# Patient Record
Sex: FEMALE | Race: WHITE | NOT HISPANIC OR LATINO | Employment: FULL TIME | URBAN - METROPOLITAN AREA
[De-identification: names, ages, dates, MRNs, and addresses within clinical notes are randomized per-mention and may not be internally consistent; named-entity substitution may affect disease eponyms.]

---

## 2020-01-30 ENCOUNTER — OFFICE VISIT (OUTPATIENT)
Dept: FAMILY MEDICINE CLINIC | Facility: CLINIC | Age: 35
End: 2020-01-30

## 2020-01-30 VITALS
TEMPERATURE: 98.6 F | WEIGHT: 121.7 LBS | BODY MASS INDEX: 20.78 KG/M2 | DIASTOLIC BLOOD PRESSURE: 71 MMHG | SYSTOLIC BLOOD PRESSURE: 114 MMHG | HEIGHT: 64 IN | HEART RATE: 57 BPM | OXYGEN SATURATION: 99 %

## 2020-01-30 DIAGNOSIS — Z13.6 ENCOUNTER FOR SCREENING FOR CARDIOVASCULAR DISORDERS: ICD-10-CM

## 2020-01-30 DIAGNOSIS — K21.9 GERD WITHOUT ESOPHAGITIS: ICD-10-CM

## 2020-01-30 DIAGNOSIS — N91.2 AMENORRHEA: ICD-10-CM

## 2020-01-30 DIAGNOSIS — Z00.00 ENCOUNTER FOR ANNUAL HEALTH EXAMINATION: Primary | ICD-10-CM

## 2020-01-30 DIAGNOSIS — J30.1 SEASONAL ALLERGIC RHINITIS DUE TO POLLEN: ICD-10-CM

## 2020-01-30 PROCEDURE — 99385 PREV VISIT NEW AGE 18-39: CPT | Performed by: FAMILY MEDICINE

## 2020-01-30 RX ORDER — FLUTICASONE PROPIONATE 50 MCG
2 SPRAY, SUSPENSION (ML) NASAL DAILY
Qty: 1 BOTTLE | Refills: 11 | Status: SHIPPED | OUTPATIENT
Start: 2020-01-30 | End: 2021-01-31 | Stop reason: SDUPTHER

## 2020-01-30 RX ORDER — PANTOPRAZOLE SODIUM 40 MG/1
40 TABLET, DELAYED RELEASE ORAL DAILY
Qty: 90 TABLET | Refills: 3 | Status: SHIPPED | OUTPATIENT
Start: 2020-01-30 | End: 2020-07-15 | Stop reason: SDUPTHER

## 2020-01-30 RX ORDER — NORGESTIMATE AND ETHINYL ESTRADIOL
1 KIT DAILY
COMMUNITY
Start: 2020-01-14

## 2020-01-30 RX ORDER — CHOLECALCIFEROL (VITAMIN D3) 25 MCG
1 TABLET,CHEWABLE ORAL DAILY
COMMUNITY
Start: 2018-06-26

## 2020-01-30 NOTE — PROGRESS NOTES
Patient here for annual physical exam    Subjective   Connie Mike is a 34 y.o. female.     History of Present Illness   34 year old wf here for annual.  Just had vaginal delivery 5 months ago.  PMH/FH/SH/ALL/MEDS reviewed. Exercising regularly.   C/o trouble with reflux daily.  Going on years.    C/o drainage for two months.  Lot of head congestion.  Lot of illness with kids.  No otc.   Lot of hoarseness.  Lot of post nasal drip.       The following portions of the patient's history were reviewed and updated as appropriate: allergies, current medications, past family history, past medical history, past social history, past surgical history and problem list  Dentist: UTD.    Last colonoscopy:NA  Optometry:UTD  Last PSA(if applicable):NA  Last mammo(if applicable):NA    Immunization History   Administered Date(s) Administered   • FLUARIX/FLUZONE/AFLURIA/FLULAVAL QUAD 10/03/2019   • Flu Vaccine Intradermal Quad 18-64YR 12/05/2014   • Hepatitis A 10/03/2019   • Tdap 12/05/2014, 06/13/2019       Review of Systems   Constitutional: Negative for appetite change and fatigue.   HENT: Negative for nosebleeds and sore throat.    Eyes: Negative for blurred vision and visual disturbance.   Respiratory: Negative for shortness of breath and wheezing.    Cardiovascular: Negative for chest pain and leg swelling.   Gastrointestinal: Negative for abdominal distention and abdominal pain.   Endocrine: Negative for cold intolerance and polyuria.   Genitourinary: Negative for dysuria and hematuria.   Musculoskeletal: Negative for arthralgias and myalgias.   Skin: Negative for color change and rash.   Neurological: Negative for weakness and confusion.   Psychiatric/Behavioral: Negative for agitation and depressed mood.       Patient Active Problem List   Diagnosis   • UTI symptoms   • Tingling   • Temporal pain   • Non-smoker   • Cystitis, chronic   • Cystitis, acute   • Arthralgia of temporomandibular joint   • Anxiety   • Amenorrhea    • Acute pharyngitis   • Abdominal pain, acute, left lower quadrant   • Encounter for annual health examination   • Seasonal allergic rhinitis due to pollen   • GERD without esophagitis   • Encounter for screening for cardiovascular disorders       No Known Allergies      Current Outpatient Medications:   •  Prenatal Vit-Fe Fum-FA-Omega (PRENATAL MULTI +DHA) 27-0.8-228 MG capsule, Take 1 capsule by mouth Daily., Disp: , Rfl:   •  TRI-LO-SPRINTEC 0.18/0.215/0.25 MG-25 MCG per tablet, Take 1 tablet by mouth Daily., Disp: , Rfl:   •  fluticasone (FLONASE) 50 MCG/ACT nasal spray, 2 sprays into the nostril(s) as directed by provider Daily., Disp: 1 bottle, Rfl: 11  •  pantoprazole (PROTONIX) 40 MG EC tablet, Take 1 tablet by mouth Daily., Disp: 90 tablet, Rfl: 3    Past Medical History:   Diagnosis Date   • Abdominal pain, acute, left lower quadrant    • Acute pharyngitis    • Amenorrhea    • Anxiety    • Arthralgia of temporomandibular joint    • Cystitis, acute    • Cystitis, chronic    • Encounter for preconception consultation    • Encounter for preventive health examination    • Non-smoker     Never a smoker   • Temporal pain    • Tingling    • UTI symptoms    • Visit for gynecologic examination        Past Surgical History:   Procedure Laterality Date   • TONSILLECTOMY         Family History   Problem Relation Age of Onset   • Alcohol abuse Father    • Hypertension Father    • Diabetes type II Father    • Breast cancer Paternal Grandmother    • No Known Problems Other        Social History     Tobacco Use   • Smoking status: Former Smoker   • Smokeless tobacco: Never Used   • Tobacco comment: quit in 2017   Substance Use Topics   • Alcohol use: Yes     Comment: 7 or less drinks per week            Objective     Vitals:    01/30/20 1027   BP: 114/71   Pulse: 57   Temp: 98.6 °F (37 °C)   SpO2: 99%     Body mass index is 20.89 kg/m².    Physical Exam   Constitutional: She appears well-developed and well-nourished.    HENT:   Head: Normocephalic and atraumatic.   Mouth/Throat: Oropharynx is clear and moist.   Eyes: Pupils are equal, round, and reactive to light. No scleral icterus.   Neck: No thyromegaly present.   Cardiovascular: Normal rate and regular rhythm. Exam reveals no gallop and no friction rub.   No murmur heard.  Pulmonary/Chest: Effort normal. No respiratory distress. She has no wheezes. She has no rales. She exhibits no tenderness.   Abdominal: Soft. Bowel sounds are normal. She exhibits no distension. There is no tenderness.   Musculoskeletal: Normal range of motion. She exhibits no edema or deformity.   Lymphadenopathy:     She has no cervical adenopathy.   Neurological: No cranial nerve deficit. She exhibits normal muscle tone.   Skin: Skin is warm and dry. No rash noted. She is not diaphoretic.   Vitals reviewed.      No results found for: GLUCOSE, BUN, CREATININE, EGFRIFNONA, EGFRIFAFRI, BCR, K, CO2, CALCIUM, PROTENTOTREF, ALBUMIN, LABIL2, BILIRUBIN, AST, ALT    WBC   Date Value Ref Range Status   09/05/2019 7.01 4.5 - 11.0 10*3/uL Final     RBC   Date Value Ref Range Status   09/05/2019 3.88 (L) 4.0 - 5.2 10*6/uL Final     Hemoglobin   Date Value Ref Range Status   09/06/2019 11.2 (L) 12.0 - 16.0 g/dL Final     Hematocrit   Date Value Ref Range Status   09/06/2019 34.3 (L) 36.0 - 46.0 % Final     MCV   Date Value Ref Range Status   09/05/2019 94.3 80.0 - 100.0 fL Final     MCH   Date Value Ref Range Status   09/05/2019 30.9 26.0 - 34.0 pg Final     MCHC   Date Value Ref Range Status   09/05/2019 32.8 31.0 - 37.0 g/dL Final     RDW   Date Value Ref Range Status   09/05/2019 13.9 12.0 - 16.8 % Final     MPV   Date Value Ref Range Status   09/05/2019 9.9 6.7 - 10.8 fL Final     Platelets   Date Value Ref Range Status   09/05/2019 168 140 - 440 10*3/uL Final     Neutrophil Rel %   Date Value Ref Range Status   09/05/2019 70.6 45 - 80 % Final     Lymphocyte Rel %   Date Value Ref Range Status   09/05/2019 19.8 15  - 50 % Final     Monocyte Rel %   Date Value Ref Range Status   09/05/2019 8.0 0 - 15 % Final     Eosinophil %   Date Value Ref Range Status   09/05/2019 1.1 0 - 7 % Final     Basophil Rel %   Date Value Ref Range Status   09/05/2019 0.1 0 - 2 % Final     Immature Grans %   Date Value Ref Range Status   09/05/2019 0.4 (H) 0 % Final     Neutrophils Absolute   Date Value Ref Range Status   09/05/2019 4.94 2.0 - 8.8 10*3/uL Final     Lymphocytes Absolute   Date Value Ref Range Status   09/05/2019 1.39 0.7 - 5.5 10*3/uL Final     Monocytes Absolute   Date Value Ref Range Status   09/05/2019 0.56 0.0 - 1.7 10*3/uL Final     Eosinophils Absolute   Date Value Ref Range Status   09/05/2019 0.08 0.0 - 0.8 10*3/uL Final     Basophils Absolute   Date Value Ref Range Status   09/05/2019 0.01 0.0 - 0.2 10*3/uL Final     Immature Grans, Absolute   Date Value Ref Range Status   09/05/2019 0.03 <1 10*3/uL Final     nRBC   Date Value Ref Range Status   09/05/2019 0 0 /100(WBC) Final       No results found for: HGBA1C    No results found for: [id number]    No results found for: TSH    No results found for: CHOL  No results found for: TRIG  No results found for: HDL  No results found for: LDL  No results found for: VLDL  No results found for: LDLHDL      Procedures    Assessment/Plan   Problems Addressed this Visit        Respiratory    Seasonal allergic rhinitis due to pollen       Digestive    GERD without esophagitis    Relevant Medications    pantoprazole (PROTONIX) 40 MG EC tablet       Genitourinary    Amenorrhea    Relevant Orders    Basic Metabolic Panel    TSH Rfx On Abnormal To Free T4       Other    Encounter for annual health examination - Primary    Encounter for screening for cardiovascular disorders    Relevant Orders    Lipid Panel With / Chol / HDL Ratio        Use zyrtec 10 once a day.    Orders Placed This Encounter   Procedures   • Basic Metabolic Panel   • Lipid Panel With / Chol / HDL Ratio   • TSH Rfx On  Abnormal To Free T4       Current Outpatient Medications   Medication Sig Dispense Refill   • Prenatal Vit-Fe Fum-FA-Omega (PRENATAL MULTI +DHA) 27-0.8-228 MG capsule Take 1 capsule by mouth Daily.     • TRI-LO-SPRINTEC 0.18/0.215/0.25 MG-25 MCG per tablet Take 1 tablet by mouth Daily.     • fluticasone (FLONASE) 50 MCG/ACT nasal spray 2 sprays into the nostril(s) as directed by provider Daily. 1 bottle 11   • pantoprazole (PROTONIX) 40 MG EC tablet Take 1 tablet by mouth Daily. 90 tablet 3     No current facility-administered medications for this visit.        Return in about 4 months (around 5/30/2020).    There are no Patient Instructions on file for this visit.

## 2020-01-31 LAB
BUN SERPL-MCNC: 8 MG/DL (ref 6–20)
BUN/CREAT SERPL: 9.5 (ref 7–25)
CALCIUM SERPL-MCNC: 9.3 MG/DL (ref 8.6–10.5)
CHLORIDE SERPL-SCNC: 102 MMOL/L (ref 98–107)
CHOLEST SERPL-MCNC: 135 MG/DL (ref 0–200)
CHOLEST/HDLC SERPL: 2.41 {RATIO}
CO2 SERPL-SCNC: 28.4 MMOL/L (ref 22–29)
CREAT SERPL-MCNC: 0.84 MG/DL (ref 0.57–1)
GLUCOSE SERPL-MCNC: 89 MG/DL (ref 65–99)
HDLC SERPL-MCNC: 56 MG/DL (ref 40–60)
LDLC SERPL CALC-MCNC: 63 MG/DL (ref 0–100)
POTASSIUM SERPL-SCNC: 4.4 MMOL/L (ref 3.5–5.2)
SODIUM SERPL-SCNC: 142 MMOL/L (ref 136–145)
TRIGL SERPL-MCNC: 80 MG/DL (ref 0–150)
TSH SERPL DL<=0.005 MIU/L-ACNC: 2.93 UIU/ML (ref 0.27–4.2)
VLDLC SERPL CALC-MCNC: 16 MG/DL

## 2020-02-18 ENCOUNTER — OFFICE VISIT (OUTPATIENT)
Dept: FAMILY MEDICINE CLINIC | Facility: CLINIC | Age: 35
End: 2020-02-18

## 2020-02-18 VITALS
HEIGHT: 64 IN | OXYGEN SATURATION: 97 % | SYSTOLIC BLOOD PRESSURE: 122 MMHG | BODY MASS INDEX: 20.93 KG/M2 | HEART RATE: 89 BPM | WEIGHT: 122.6 LBS | TEMPERATURE: 99 F | DIASTOLIC BLOOD PRESSURE: 68 MMHG

## 2020-02-18 DIAGNOSIS — R50.9 FEVER AND CHILLS: ICD-10-CM

## 2020-02-18 DIAGNOSIS — R50.9 FEVER AND CHILLS: Primary | ICD-10-CM

## 2020-02-18 LAB
EXPIRATION DATE: NORMAL
FLUAV AG NPH QL: NEGATIVE
FLUBV AG NPH QL: NEGATIVE
INTERNAL CONTROL: NORMAL
Lab: NORMAL

## 2020-02-18 PROCEDURE — 99213 OFFICE O/P EST LOW 20 MIN: CPT | Performed by: FAMILY MEDICINE

## 2020-02-18 PROCEDURE — 87804 INFLUENZA ASSAY W/OPTIC: CPT | Performed by: FAMILY MEDICINE

## 2020-02-18 RX ORDER — AMOXICILLIN AND CLAVULANATE POTASSIUM 875; 125 MG/1; MG/1
1 TABLET, FILM COATED ORAL 2 TIMES DAILY
Qty: 20 TABLET | Refills: 0 | Status: SHIPPED | OUTPATIENT
Start: 2020-02-18 | End: 2020-02-18 | Stop reason: SDUPTHER

## 2020-02-18 RX ORDER — AMOXICILLIN AND CLAVULANATE POTASSIUM 875; 125 MG/1; MG/1
1 TABLET, FILM COATED ORAL 2 TIMES DAILY
Qty: 20 TABLET | Refills: 0 | Status: SHIPPED | OUTPATIENT
Start: 2020-02-18 | End: 2020-03-18

## 2020-02-18 RX ORDER — GUAIFENESIN AND CODEINE PHOSPHATE 100; 10 MG/5ML; MG/5ML
5 SOLUTION ORAL 3 TIMES DAILY PRN
Qty: 200 ML | Refills: 0 | Status: SHIPPED | OUTPATIENT
Start: 2020-02-18 | End: 2020-02-18 | Stop reason: SDUPTHER

## 2020-02-18 RX ORDER — GUAIFENESIN AND CODEINE PHOSPHATE 100; 10 MG/5ML; MG/5ML
5 SOLUTION ORAL 3 TIMES DAILY PRN
Qty: 200 ML | Refills: 0 | Status: SHIPPED | OUTPATIENT
Start: 2020-02-18 | End: 2020-03-18

## 2020-02-18 RX ORDER — CETIRIZINE HYDROCHLORIDE 10 MG/1
10 TABLET ORAL DAILY
COMMUNITY

## 2020-02-18 NOTE — TELEPHONE ENCOUNTER
Was in to see Dr Kim today & was not happy with her visit, felt very rushed & did not get her questioned addressed, she is on flonase & zyrtec which she is not sure if she needs to continue at this time while she is being treated for her URI / sinus infection & taking the Augmentin & Guaifenesin AC,,, her daughter has been diag with bronchitis & was given a breathing treatment, patient has an inhaler & wants to know if she should be using it & patient's ears don't hurt but they are very clogged up, pressure like & they were not looked at, is this related to the URI / Sinus Infection?

## 2020-02-18 NOTE — TELEPHONE ENCOUNTER
Patient's medication needs to go to the [facility]'s on [address] then once they have been sent I can delete the other pharm from her chart. Patient was unhappy with her OV as well & asked for a message to be sent to [facility] for Dr. Lindsay with questions that she has

## 2020-02-18 NOTE — PROGRESS NOTES
"Sherin Mike is a 34 y.o. female.     Chief Complaint   Patient presents with   • Cough   • Fever     x 1 day   • Chills     x 1 day       History of Present Illness   Cough and congestion for a few weeks and was treating for allergies, now general fever and chills for 1 day. Good  P.o.  Started after sick contact.    The following portions of the patient's history were reviewed and updated as appropriate: allergies, current medications, past family history, past medical history, past social history, past surgical history and problem list.    Past Medical History:   Diagnosis Date   • Abdominal pain, acute, left lower quadrant    • Acute pharyngitis    • Amenorrhea    • Anxiety    • Arthralgia of temporomandibular joint    • Cystitis, acute    • Cystitis, chronic    • Encounter for preconception consultation    • Encounter for preventive health examination    • Non-smoker     Never a smoker   • Temporal pain    • Tingling    • UTI symptoms    • Visit for gynecologic examination        Past Surgical History:   Procedure Laterality Date   • TONSILLECTOMY         Family History   Problem Relation Age of Onset   • Alcohol abuse Father    • Hypertension Father    • Diabetes type II Father    • Breast cancer Paternal Grandmother    • No Known Problems Other        Social History     Socioeconomic History   • Marital status:      Spouse name: Not on file   • Number of children: Not on file   • Years of education: Not on file   • Highest education level: Not on file   Tobacco Use   • Smoking status: Former Smoker   • Smokeless tobacco: Never Used   • Tobacco comment: quit in 2017   Substance and Sexual Activity   • Alcohol use: Yes     Comment: 7 or less drinks per week   • Drug use: Never   • Sexual activity: Yes       Review of Systems   Respiratory: Negative for shortness of breath.        Objective   Visit Vitals  /68   Pulse 89   Temp 99 °F (37.2 °C) (Temporal)   Ht 162.6 cm (64\")   Wt 55.6 " kg (122 lb 9.6 oz)   SpO2 97%   BMI 21.04 kg/m²     Body mass index is 21.04 kg/m².  Physical Exam   Constitutional: She is oriented to person, place, and time. She appears well-developed and well-nourished.   HENT:   Op drainage   Cardiovascular: Normal rate, regular rhythm, normal heart sounds and intact distal pulses.   Pulmonary/Chest: Effort normal and breath sounds normal.   Musculoskeletal: Normal range of motion. She exhibits no edema.   Neurological: She is alert and oriented to person, place, and time.   Skin: Skin is warm and dry.   Psychiatric: She has a normal mood and affect. Her behavior is normal.         Assessment/Plan   Connie was seen today for cough, fever and chills.    Diagnoses and all orders for this visit:    Fever and chills  -     POCT Influenza A/B  -     amoxicillin-clavulanate (AUGMENTIN) 875-125 MG per tablet; Take 1 tablet by mouth 2 (Two) Times a Day.  -     guaiFENesin-codeine (GUAIFENESIN AC) 100-10 MG/5ML liquid; Take 5 mL by mouth 3 (Three) Times a Day As Needed for Cough.      Rest, fluids and follow up if worse or no better.

## 2020-02-19 ENCOUNTER — TELEPHONE (OUTPATIENT)
Dept: FAMILY MEDICINE CLINIC | Facility: CLINIC | Age: 35
End: 2020-02-19

## 2020-02-19 DIAGNOSIS — J11.1 INFLUENZA: Primary | ICD-10-CM

## 2020-02-19 RX ORDER — OSELTAMIVIR PHOSPHATE 75 MG/1
75 CAPSULE ORAL 2 TIMES DAILY
Qty: 10 CAPSULE | Refills: 0 | Status: SHIPPED | OUTPATIENT
Start: 2020-02-19 | End: 2020-07-15

## 2020-02-19 NOTE — TELEPHONE ENCOUNTER
Patient was prescribed an antibiotic earlier this week and was diagnosed with the flu. Should she continue the antibiotic while taking Tamiflu? Also, her  Emmanuel [birth year], was exposed and wants to know if Tamiflu can be called in for him? [phone]

## 2020-02-19 NOTE — TELEPHONE ENCOUNTER
Patient was seen yesterday by Dr Kim and was diagnosed with a upper respitory/sinus infection.  She had a slight fever and they did a flu test but it came back negative.  She said today she feels worse and her fever is 101.4.  She said she wants to know if she could still possibly have the flu and it just was negative yesterday, if she needs to be retested or what she should do?  She is worried because Dr Kim said if she would have had the flu, she would have given her a different treatment.  She wanted to see what Dr Lindsay thinks?  Her phone number is [phone].

## 2020-02-19 NOTE — TELEPHONE ENCOUNTER
Spoke with gricel, he wants to know if you think he needs tamiflu. I advised for mj to just finish out the antibiotic as well

## 2020-03-18 ENCOUNTER — TELEPHONE (OUTPATIENT)
Dept: FAMILY MEDICINE CLINIC | Facility: CLINIC | Age: 35
End: 2020-03-18

## 2020-03-18 RX ORDER — AMOXICILLIN 875 MG/1
875 TABLET, COATED ORAL 2 TIMES DAILY
Qty: 20 TABLET | Refills: 0 | Status: SHIPPED | OUTPATIENT
Start: 2020-03-18 | End: 2020-03-28

## 2020-07-15 ENCOUNTER — OFFICE VISIT (OUTPATIENT)
Dept: FAMILY MEDICINE CLINIC | Facility: CLINIC | Age: 35
End: 2020-07-15

## 2020-07-15 VITALS
WEIGHT: 126 LBS | SYSTOLIC BLOOD PRESSURE: 107 MMHG | HEIGHT: 64 IN | BODY MASS INDEX: 21.51 KG/M2 | DIASTOLIC BLOOD PRESSURE: 61 MMHG | OXYGEN SATURATION: 95 % | TEMPERATURE: 98.6 F | HEART RATE: 105 BPM

## 2020-07-15 DIAGNOSIS — K21.9 GERD WITHOUT ESOPHAGITIS: Primary | ICD-10-CM

## 2020-07-15 DIAGNOSIS — J30.1 SEASONAL ALLERGIC RHINITIS DUE TO POLLEN: ICD-10-CM

## 2020-07-15 PROBLEM — Z28.39 IMMUNIZATION DEFICIENCY: Status: ACTIVE | Noted: 2020-07-15

## 2020-07-15 PROCEDURE — 99213 OFFICE O/P EST LOW 20 MIN: CPT | Performed by: FAMILY MEDICINE

## 2020-07-15 PROCEDURE — 90471 IMMUNIZATION ADMIN: CPT | Performed by: FAMILY MEDICINE

## 2020-07-15 PROCEDURE — 90632 HEPA VACCINE ADULT IM: CPT | Performed by: FAMILY MEDICINE

## 2020-07-15 RX ORDER — PANTOPRAZOLE SODIUM 40 MG/1
40 TABLET, DELAYED RELEASE ORAL DAILY
Qty: 90 TABLET | Refills: 3
Start: 2020-07-15 | End: 2021-01-24

## 2020-07-15 NOTE — PROGRESS NOTES
Chief Complaint   Patient presents with   • Heartburn       Subjective   Connie Mike is a 34 y.o. female.     History of Present Illness   F/U DIANN.   Much better.  I used to have it daily.  Now only with tomato base foods.    F/U AR.  Doing well with zyrtec daily.  I use fluticasone daily.    Immunizaiton def.  Needs second Hep A.      The following portions of the patient's history were reviewed and updated as appropriate: allergies, current medications, past family history, past medical history, past social history, past surgical history and problem list.    Review of Systems   Constitutional: Negative for appetite change and fatigue.   HENT: Negative for nosebleeds and sore throat.    Eyes: Negative for blurred vision and visual disturbance.   Respiratory: Negative for shortness of breath and wheezing.    Cardiovascular: Negative for chest pain and leg swelling.   Gastrointestinal: Negative for abdominal distention and abdominal pain.   Endocrine: Negative for cold intolerance and polyuria.   Genitourinary: Negative for dysuria and hematuria.   Musculoskeletal: Negative for arthralgias and myalgias.   Skin: Negative for color change and rash.   Neurological: Negative for weakness and confusion.   Psychiatric/Behavioral: Negative for agitation and depressed mood.       Patient Active Problem List   Diagnosis   • UTI symptoms   • Tingling   • Temporal pain   • Non-smoker   • Cystitis, chronic   • Cystitis, acute   • Arthralgia of temporomandibular joint   • Anxiety   • Amenorrhea   • Acute pharyngitis   • Abdominal pain, acute, left lower quadrant   • Encounter for annual health examination   • Seasonal allergic rhinitis due to pollen   • GERD without esophagitis   • Encounter for screening for cardiovascular disorders   • Immunization deficiency       No Known Allergies      Current Outpatient Medications:   •  cetirizine (zyrTEC) 10 MG tablet, Take 10 mg by mouth Daily., Disp: , Rfl:   •  fluticasone (FLONASE)  50 MCG/ACT nasal spray, 2 sprays into the nostril(s) as directed by provider Daily., Disp: 1 bottle, Rfl: 11  •  pantoprazole (PROTONIX) 40 MG EC tablet, Take 1 tablet by mouth Daily., Disp: 90 tablet, Rfl: 3  •  Prenatal Vit-Fe Fum-FA-Omega (PRENATAL MULTI +DHA) 27-0.8-228 MG capsule, Take 1 capsule by mouth Daily., Disp: , Rfl:   •  TRI-LO-SPRINTEC 0.18/0.215/0.25 MG-25 MCG per tablet, Take 1 tablet by mouth Daily., Disp: , Rfl:     Past Medical History:   Diagnosis Date   • Abdominal pain, acute, left lower quadrant    • Acute pharyngitis    • Amenorrhea    • Anxiety    • Arthralgia of temporomandibular joint    • Cystitis, acute    • Cystitis, chronic    • Encounter for preconception consultation    • Encounter for preventive health examination    • Non-smoker     Never a smoker   • Temporal pain    • Tingling    • UTI symptoms    • Visit for gynecologic examination        Past Surgical History:   Procedure Laterality Date   • TONSILLECTOMY         Family History   Problem Relation Age of Onset   • Alcohol abuse Father    • Hypertension Father    • Diabetes type II Father    • Breast cancer Paternal Grandmother    • No Known Problems Other        Social History     Tobacco Use   • Smoking status: Former Smoker   • Smokeless tobacco: Never Used   • Tobacco comment: quit in 2017   Substance Use Topics   • Alcohol use: Yes     Comment: 7 or less drinks per week            Objective     Vitals:    07/15/20 1322   BP: 107/61   Pulse: 105   Temp: 98.6 °F (37 °C)   SpO2: 95%     Body mass index is 21.62 kg/m².    Physical Exam   Constitutional: She appears well-developed and well-nourished.   HENT:   Head: Normocephalic and atraumatic.   Mouth/Throat: Oropharynx is clear and moist.   Eyes: Pupils are equal, round, and reactive to light. No scleral icterus.   Neck: No thyromegaly present.   Cardiovascular: Normal rate and regular rhythm. Exam reveals no gallop and no friction rub.   No murmur heard.  Pulmonary/Chest:  Effort normal. No respiratory distress. She has no wheezes. She has no rales. She exhibits no tenderness.   Abdominal: Soft. Bowel sounds are normal. She exhibits no distension. There is no tenderness.   Musculoskeletal: Normal range of motion. She exhibits no edema or deformity.   Lymphadenopathy:     She has no cervical adenopathy.   Neurological: No cranial nerve deficit. She exhibits normal muscle tone.   Skin: Skin is warm and dry. No rash noted. She is not diaphoretic.   Vitals reviewed.      Lab Results   Component Value Date    BUN 8 01/30/2020    CREATININE 0.84 01/30/2020    EGFRIFNONA 78 01/30/2020    EGFRIFAFRI 94 01/30/2020    BCR 9.5 01/30/2020    K 4.4 01/30/2020    CO2 28.4 01/30/2020    CALCIUM 9.3 01/30/2020       WBC   Date Value Ref Range Status   09/05/2019 7.01 4.5 - 11.0 10*3/uL Final     RBC   Date Value Ref Range Status   09/05/2019 3.88 (L) 4.0 - 5.2 10*6/uL Final     Hemoglobin   Date Value Ref Range Status   09/06/2019 11.2 (L) 12.0 - 16.0 g/dL Final     Hematocrit   Date Value Ref Range Status   09/06/2019 34.3 (L) 36.0 - 46.0 % Final     MCV   Date Value Ref Range Status   09/05/2019 94.3 80.0 - 100.0 fL Final     MCH   Date Value Ref Range Status   09/05/2019 30.9 26.0 - 34.0 pg Final     MCHC   Date Value Ref Range Status   09/05/2019 32.8 31.0 - 37.0 g/dL Final     RDW   Date Value Ref Range Status   09/05/2019 13.9 12.0 - 16.8 % Final     MPV   Date Value Ref Range Status   09/05/2019 9.9 6.7 - 10.8 fL Final     Platelets   Date Value Ref Range Status   09/05/2019 168 140 - 440 10*3/uL Final     Neutrophil Rel %   Date Value Ref Range Status   09/05/2019 70.6 45 - 80 % Final     Lymphocyte Rel %   Date Value Ref Range Status   09/05/2019 19.8 15 - 50 % Final     Monocyte Rel %   Date Value Ref Range Status   09/05/2019 8.0 0 - 15 % Final     Eosinophil %   Date Value Ref Range Status   09/05/2019 1.1 0 - 7 % Final     Basophil Rel %   Date Value Ref Range Status   09/05/2019 0.1 0 -  2 % Final     Immature Grans %   Date Value Ref Range Status   09/05/2019 0.4 (H) 0 % Final     Neutrophils Absolute   Date Value Ref Range Status   09/05/2019 4.94 2.0 - 8.8 10*3/uL Final     Lymphocytes Absolute   Date Value Ref Range Status   09/05/2019 1.39 0.7 - 5.5 10*3/uL Final     Monocytes Absolute   Date Value Ref Range Status   09/05/2019 0.56 0.0 - 1.7 10*3/uL Final     Eosinophils Absolute   Date Value Ref Range Status   09/05/2019 0.08 0.0 - 0.8 10*3/uL Final     Basophils Absolute   Date Value Ref Range Status   09/05/2019 0.01 0.0 - 0.2 10*3/uL Final     Immature Grans, Absolute   Date Value Ref Range Status   09/05/2019 0.03 <1 10*3/uL Final     nRBC   Date Value Ref Range Status   09/05/2019 0 0 /100(WBC) Final       No results found for: HGBA1C    No results found for: [id number]    TSH   Date Value Ref Range Status   01/30/2020 2.930 0.270 - 4.200 uIU/mL Final       No results found for: CHOL  Lab Results   Component Value Date    TRIG 80 01/30/2020     Lab Results   Component Value Date    HDL 56 01/30/2020     Lab Results   Component Value Date    LDL 63 01/30/2020     Lab Results   Component Value Date    VLDL 16 01/30/2020     No results found for: LDLHDL      Procedures    Assessment/Plan   Problems Addressed this Visit        Respiratory    Seasonal allergic rhinitis due to pollen       Digestive    GERD without esophagitis - Primary    Relevant Medications    pantoprazole (PROTONIX) 40 MG EC tablet          Orders Placed This Encounter   Procedures   • Hepatitis A Vaccine Adult IM       Current Outpatient Medications   Medication Sig Dispense Refill   • cetirizine (zyrTEC) 10 MG tablet Take 10 mg by mouth Daily.     • fluticasone (FLONASE) 50 MCG/ACT nasal spray 2 sprays into the nostril(s) as directed by provider Daily. 1 bottle 11   • pantoprazole (PROTONIX) 40 MG EC tablet Take 1 tablet by mouth Daily. 90 tablet 3   • Prenatal Vit-Fe Fum-FA-Omega (PRENATAL MULTI +DHA) 27-0.8-228 MG  capsule Take 1 capsule by mouth Daily.     • TRI-LO-SPRINTEC 0.18/0.215/0.25 MG-25 MCG per tablet Take 1 tablet by mouth Daily.       No current facility-administered medications for this visit.        Connie Mike had no medications administered during this visit.    Return in about 1 year (around 7/15/2021).    There are no Patient Instructions on file for this visit.

## 2021-01-24 RX ORDER — PANTOPRAZOLE SODIUM 40 MG/1
40 TABLET, DELAYED RELEASE ORAL DAILY
Qty: 90 TABLET | Refills: 3 | Status: SHIPPED | OUTPATIENT
Start: 2021-01-24 | End: 2022-03-02 | Stop reason: SDUPTHER

## 2021-01-31 RX ORDER — FLUTICASONE PROPIONATE 50 MCG
SPRAY, SUSPENSION (ML) NASAL
Qty: 16 G | Refills: 11 | Status: SHIPPED | OUTPATIENT
Start: 2021-01-31 | End: 2022-03-07

## 2021-04-16 ENCOUNTER — BULK ORDERING (OUTPATIENT)
Dept: CASE MANAGEMENT | Facility: OTHER | Age: 36
End: 2021-04-16

## 2021-04-16 DIAGNOSIS — Z23 IMMUNIZATION DUE: ICD-10-CM

## 2021-04-22 ENCOUNTER — OFFICE VISIT (OUTPATIENT)
Dept: FAMILY MEDICINE CLINIC | Facility: CLINIC | Age: 36
End: 2021-04-22

## 2021-04-22 VITALS
HEIGHT: 64 IN | HEART RATE: 59 BPM | DIASTOLIC BLOOD PRESSURE: 58 MMHG | WEIGHT: 128 LBS | OXYGEN SATURATION: 98 % | TEMPERATURE: 98.7 F | SYSTOLIC BLOOD PRESSURE: 94 MMHG | BODY MASS INDEX: 21.85 KG/M2

## 2021-04-22 DIAGNOSIS — K21.9 GERD WITHOUT ESOPHAGITIS: ICD-10-CM

## 2021-04-22 DIAGNOSIS — R14.0 BLOATING: ICD-10-CM

## 2021-04-22 DIAGNOSIS — K59.04 CHRONIC IDIOPATHIC CONSTIPATION: Primary | ICD-10-CM

## 2021-04-22 PROCEDURE — 99214 OFFICE O/P EST MOD 30 MIN: CPT | Performed by: FAMILY MEDICINE

## 2021-04-22 NOTE — PROGRESS NOTES
C/o constipation.    Subjective   Connie Mike is a 35 y.o. female.     History of Present Illness   C/o trouble with irritation and cramping in stomach. I feel bloated all the time.  I am tabatha rin the morning.  Trouble iwht constipation often.   Unable to have improvement with removal of milk products.  No heartburn issues.   No diarrhea.  Hx of constipation.    The following portions of the patient's history were reviewed and updated as appropriate: allergies, current medications, past family history, past medical history, past social history, past surgical history and problem list.    Review of Systems   Constitutional: Negative for appetite change and fatigue.   HENT: Negative for nosebleeds and sore throat.    Eyes: Negative for blurred vision and visual disturbance.   Respiratory: Negative for shortness of breath and wheezing.    Cardiovascular: Negative for chest pain and leg swelling.   Gastrointestinal: Positive for constipation. Negative for abdominal distention, abdominal pain and diarrhea.   Endocrine: Negative for cold intolerance and polyuria.   Genitourinary: Negative for dysuria and hematuria.   Musculoskeletal: Negative for arthralgias and myalgias.   Skin: Negative for color change and rash.   Neurological: Negative for weakness and confusion.   Psychiatric/Behavioral: Negative for agitation and depressed mood.       Patient Active Problem List   Diagnosis   • UTI symptoms   • Tingling   • Temporal pain   • Non-smoker   • Cystitis, chronic   • Cystitis, acute   • Arthralgia of temporomandibular joint   • Anxiety   • Amenorrhea   • Acute pharyngitis   • Abdominal pain, acute, left lower quadrant   • Encounter for annual health examination   • Seasonal allergic rhinitis due to pollen   • GERD without esophagitis   • Encounter for screening for cardiovascular disorders   • Immunization deficiency   • Chronic idiopathic constipation   • Bloating       No Known Allergies      Current Outpatient  Medications:   •  cetirizine (zyrTEC) 10 MG tablet, Take 10 mg by mouth Daily., Disp: , Rfl:   •  fluticasone (FLONASE) 50 MCG/ACT nasal spray, INSTILL 2 SPRAYS IN EACH NOSTRIL EVERY DAY AS DIRECTED BY PROVIDER, Disp: 16 g, Rfl: 11  •  pantoprazole (PROTONIX) 40 MG EC tablet, TAKE 1 TABLET BY MOUTH DAILY, Disp: 90 tablet, Rfl: 3  •  Prenatal Vit-Fe Fum-FA-Omega (PRENATAL MULTI +DHA) 27-0.8-228 MG capsule, Take 1 capsule by mouth Daily., Disp: , Rfl:   •  TRI-LO-SPRINTEC 0.18/0.215/0.25 MG-25 MCG per tablet, Take 1 tablet by mouth Daily., Disp: , Rfl:     Past Medical History:   Diagnosis Date   • Abdominal pain, acute, left lower quadrant    • Acute pharyngitis    • Amenorrhea    • Anxiety    • Arthralgia of temporomandibular joint    • Cystitis, acute    • Cystitis, chronic    • Encounter for preconception consultation    • Encounter for preventive health examination    • Non-smoker     Never a smoker   • Temporal pain    • Tingling    • UTI symptoms    • Visit for gynecologic examination        Past Surgical History:   Procedure Laterality Date   • TONSILLECTOMY         Family History   Problem Relation Age of Onset   • Alcohol abuse Father    • Hypertension Father    • Diabetes type II Father    • Breast cancer Paternal Grandmother    • No Known Problems Other        Social History     Tobacco Use   • Smoking status: Former Smoker   • Smokeless tobacco: Never Used   • Tobacco comment: quit in 2017   Substance Use Topics   • Alcohol use: Yes     Comment: 7 or less drinks per week            Objective     Vitals:    04/22/21 1529   BP: 94/58   Pulse: 59   Temp: 98.7 °F (37.1 °C)   SpO2: 98%     Body mass index is 21.96 kg/m².    Physical Exam  Vitals reviewed.   Constitutional:       Appearance: She is well-developed. She is not diaphoretic.   HENT:      Head: Normocephalic and atraumatic.   Eyes:      General: No scleral icterus.     Pupils: Pupils are equal, round, and reactive to light.   Neck:      Thyroid: No  thyromegaly.   Cardiovascular:      Rate and Rhythm: Normal rate and regular rhythm.      Heart sounds: No murmur heard.   No friction rub. No gallop.    Pulmonary:      Effort: Pulmonary effort is normal. No respiratory distress.      Breath sounds: No wheezing or rales.   Chest:      Chest wall: No tenderness.   Abdominal:      General: Bowel sounds are normal. There is no distension.      Palpations: Abdomen is soft.      Tenderness: There is no abdominal tenderness.   Musculoskeletal:         General: No deformity. Normal range of motion.   Lymphadenopathy:      Cervical: No cervical adenopathy.   Skin:     General: Skin is warm and dry.      Findings: No rash.   Neurological:      Cranial Nerves: No cranial nerve deficit.      Motor: No abnormal muscle tone.         Lab Results   Component Value Date    BUN 8 01/30/2020    CREATININE 0.84 01/30/2020    EGFRIFNONA 78 01/30/2020    EGFRIFAFRI 94 01/30/2020    BCR 9.5 01/30/2020    K 4.4 01/30/2020    CO2 28.4 01/30/2020    CALCIUM 9.3 01/30/2020       WBC   Date Value Ref Range Status   09/05/2019 7.01 4.5 - 11.0 10*3/uL Final     RBC   Date Value Ref Range Status   09/05/2019 3.88 (L) 4.0 - 5.2 10*6/uL Final     Hemoglobin   Date Value Ref Range Status   09/06/2019 11.2 (L) 12.0 - 16.0 g/dL Final     Hematocrit   Date Value Ref Range Status   09/06/2019 34.3 (L) 36.0 - 46.0 % Final     MCV   Date Value Ref Range Status   09/05/2019 94.3 80.0 - 100.0 fL Final     MCH   Date Value Ref Range Status   09/05/2019 30.9 26.0 - 34.0 pg Final     MCHC   Date Value Ref Range Status   09/05/2019 32.8 31.0 - 37.0 g/dL Final     RDW   Date Value Ref Range Status   09/05/2019 13.9 12.0 - 16.8 % Final     MPV   Date Value Ref Range Status   09/05/2019 9.9 6.7 - 10.8 fL Final     Platelets   Date Value Ref Range Status   09/05/2019 168 140 - 440 10*3/uL Final     Neutrophil Rel %   Date Value Ref Range Status   09/05/2019 70.6 45 - 80 % Final     Lymphocyte Rel %   Date Value  Ref Range Status   09/05/2019 19.8 15 - 50 % Final     Monocyte Rel %   Date Value Ref Range Status   09/05/2019 8.0 0 - 15 % Final     Eosinophil %   Date Value Ref Range Status   09/05/2019 1.1 0 - 7 % Final     Basophil Rel %   Date Value Ref Range Status   09/05/2019 0.1 0 - 2 % Final     Immature Grans %   Date Value Ref Range Status   09/05/2019 0.4 (H) 0 % Final     Neutrophils Absolute   Date Value Ref Range Status   09/05/2019 4.94 2.0 - 8.8 10*3/uL Final     Lymphocytes Absolute   Date Value Ref Range Status   09/05/2019 1.39 0.7 - 5.5 10*3/uL Final     Monocytes Absolute   Date Value Ref Range Status   09/05/2019 0.56 0.0 - 1.7 10*3/uL Final     Eosinophils Absolute   Date Value Ref Range Status   09/05/2019 0.08 0.0 - 0.8 10*3/uL Final     Basophils Absolute   Date Value Ref Range Status   09/05/2019 0.01 0.0 - 0.2 10*3/uL Final     Immature Grans, Absolute   Date Value Ref Range Status   09/05/2019 0.03 <1 10*3/uL Final     nRBC   Date Value Ref Range Status   09/05/2019 0 0 /100(WBC) Final       No results found for: HGBA1C    No results found for: [id number]    TSH   Date Value Ref Range Status   01/30/2020 2.930 0.270 - 4.200 uIU/mL Final       No results found for: CHOL  Lab Results   Component Value Date    TRIG 80 01/30/2020     Lab Results   Component Value Date    HDL 56 01/30/2020     Lab Results   Component Value Date    LDL 63 01/30/2020     Lab Results   Component Value Date    VLDL 16 01/30/2020     No results found for: LDLHDL      Procedures    Assessment/Plan   Problems Addressed this Visit     Bloating    Chronic idiopathic constipation - Primary    GERD without esophagitis      Diagnoses       Codes Comments    Chronic idiopathic constipation    -  Primary ICD-10-CM: K59.04  ICD-9-CM: 564.00     Bloating     ICD-10-CM: R14.0  ICD-9-CM: 787.3     GERD without esophagitis     ICD-10-CM: K21.9  ICD-9-CM: 530.81       New problem, uncontrolled constipation.  Start PEG titrated to 1 cap  daily.    DIANN.  Controlled.  Continue PPI.      No orders of the defined types were placed in this encounter.      Current Outpatient Medications   Medication Sig Dispense Refill   • cetirizine (zyrTEC) 10 MG tablet Take 10 mg by mouth Daily.     • fluticasone (FLONASE) 50 MCG/ACT nasal spray INSTILL 2 SPRAYS IN EACH NOSTRIL EVERY DAY AS DIRECTED BY PROVIDER 16 g 11   • pantoprazole (PROTONIX) 40 MG EC tablet TAKE 1 TABLET BY MOUTH DAILY 90 tablet 3   • Prenatal Vit-Fe Fum-FA-Omega (PRENATAL MULTI +DHA) 27-0.8-228 MG capsule Take 1 capsule by mouth Daily.     • TRI-LO-SPRINTEC 0.18/0.215/0.25 MG-25 MCG per tablet Take 1 tablet by mouth Daily.       No current facility-administered medications for this visit.       Connie Mike had no medications administered during this visit.    Return in about 6 weeks (around 6/3/2021).    There are no Patient Instructions on file for this visit.

## 2021-11-02 RX ORDER — PANTOPRAZOLE SODIUM 40 MG/1
40 TABLET, DELAYED RELEASE ORAL DAILY
Qty: 90 TABLET | Refills: 3 | OUTPATIENT
Start: 2021-11-02

## 2021-11-23 ENCOUNTER — TELEPHONE (OUTPATIENT)
Dept: FAMILY MEDICINE CLINIC | Facility: CLINIC | Age: 36
End: 2021-11-23

## 2021-11-23 DIAGNOSIS — R43.2 ABNORMAL SENSE OF TASTE: Primary | ICD-10-CM

## 2021-11-23 NOTE — TELEPHONE ENCOUNTER
Caller: Connie Mike    Relationship: Self    Best call back number: [phone]     What is the best time to reach you: ANYTIME    Who are you requesting to speak with (clinical staff, provider,  specific staff member): CLINICAL / PROVIDER     Do you know the name of the person who called:     What was the call regarding: PATIENT CALLING WANTING TO KNOW IF THERE IS ANYTHING THAT SHE CAN DO TO GET HER TASTE AND SMELL BACK. SHE STATED SHE TESTED NEGATIVE FOR COVID IN October SHE WOULD LIKE TO KNOW IF SHE NEEDS TO SEE AN ENT OR WHAT SHE SHOULD DO    Do you require a callback: YES

## 2022-03-02 RX ORDER — PANTOPRAZOLE SODIUM 40 MG/1
40 TABLET, DELAYED RELEASE ORAL DAILY
Qty: 90 TABLET | Refills: 3 | Status: SHIPPED | OUTPATIENT
Start: 2022-03-02 | End: 2022-03-24 | Stop reason: SDUPTHER

## 2022-03-02 RX ORDER — PANTOPRAZOLE SODIUM 40 MG/1
40 TABLET, DELAYED RELEASE ORAL DAILY
Qty: 90 TABLET | Refills: 3 | OUTPATIENT
Start: 2022-03-02

## 2022-03-02 NOTE — TELEPHONE ENCOUNTER
Caller: BradyChiara hintona    Relationship: Self    Best call back number: [phone]    Requested Prescriptions:   Requested Prescriptions     Pending Prescriptions Disp Refills   • pantoprazole (PROTONIX) 40 MG EC tablet 90 tablet 3     Sig: Take 1 tablet by mouth Daily.        Pharmacy where request should be sent: [id number] [place], [place] - [address] AT [address] - [phone]  - [phone] FX     Additional details provided by patient: PATIENT IS SCHEDULED FOR AN APPOINTMENT ON 3/24/22, WANTS TO KNOW IF SHE CAN HAVE ENOUGH CALLED IN TO MAKE IT UNTIL APPOINTMENT    Does the patient have less than a 3 day supply:  [x] Yes  [] No    Arcenio Rowland Rep   03/02/22 09:53 EST

## 2022-03-07 RX ORDER — FLUTICASONE PROPIONATE 50 MCG
SPRAY, SUSPENSION (ML) NASAL
Qty: 16 G | Refills: 11 | Status: SHIPPED | OUTPATIENT
Start: 2022-03-07

## 2022-03-24 ENCOUNTER — OFFICE VISIT (OUTPATIENT)
Dept: FAMILY MEDICINE CLINIC | Facility: CLINIC | Age: 37
End: 2022-03-24

## 2022-03-24 VITALS
BODY MASS INDEX: 21.1 KG/M2 | WEIGHT: 123.6 LBS | TEMPERATURE: 97.8 F | HEART RATE: 63 BPM | HEIGHT: 64 IN | SYSTOLIC BLOOD PRESSURE: 110 MMHG | DIASTOLIC BLOOD PRESSURE: 60 MMHG | OXYGEN SATURATION: 98 %

## 2022-03-24 DIAGNOSIS — T78.40XA ALLERGIC REACTION, INITIAL ENCOUNTER: ICD-10-CM

## 2022-03-24 DIAGNOSIS — K59.04 CHRONIC IDIOPATHIC CONSTIPATION: ICD-10-CM

## 2022-03-24 DIAGNOSIS — Z00.00 ENCOUNTER FOR ANNUAL HEALTH EXAMINATION: Primary | ICD-10-CM

## 2022-03-24 DIAGNOSIS — Z13.6 ENCOUNTER FOR LIPID SCREENING FOR CARDIOVASCULAR DISEASE: ICD-10-CM

## 2022-03-24 DIAGNOSIS — K21.9 GERD WITHOUT ESOPHAGITIS: ICD-10-CM

## 2022-03-24 DIAGNOSIS — D64.89 ANEMIA DUE TO OTHER CAUSE, NOT CLASSIFIED: ICD-10-CM

## 2022-03-24 DIAGNOSIS — Z13.220 ENCOUNTER FOR LIPID SCREENING FOR CARDIOVASCULAR DISEASE: ICD-10-CM

## 2022-03-24 PROCEDURE — 99214 OFFICE O/P EST MOD 30 MIN: CPT | Performed by: FAMILY MEDICINE

## 2022-03-24 PROCEDURE — 99395 PREV VISIT EST AGE 18-39: CPT | Performed by: FAMILY MEDICINE

## 2022-03-24 RX ORDER — PANTOPRAZOLE SODIUM 40 MG/1
40 TABLET, DELAYED RELEASE ORAL DAILY
Qty: 90 TABLET | Refills: 3 | Status: SHIPPED | OUTPATIENT
Start: 2022-03-24 | End: 2023-03-02

## 2022-03-24 NOTE — PROGRESS NOTES
Patient here for annual physical exam  F/U allergic reaction.  Subjective   Connie Mike is a 36 y.o. female.     History of Present Illness   36 year old WF here for annual.     The following portions of the patient's history were reviewed and updated as appropriate: allergies, current medications, past family history, past medical history, past social history, past surgical history and problem list    Last colonoscopy:NA  Optometry:UTD.  Dentist: UTD.  Last PSA(if applicable):NA  Last mammo(if applicable):NA      F/U DIANN.  Doing well with pantoprazole.  Only heartburn 1 time a week.   F/U constipation.  Doing well with meds.    Hx of anemia.  Hg 11.2 from 2 years ago.    C/o trouble with allergic reaction with foods.  6 times since December.  Has hives.      Immunization History   Administered Date(s) Administered   • COVID-19 (PFIZER) PURPLE CAP 03/26/2021, 04/15/2021, 11/16/2021   • Flu Vaccine Intradermal Quad 18-64YR 12/05/2014   • Flu Vaccine Split Quad 10/22/2021   • FluLaval/Fluarix/Fluzone >6 10/03/2019   • Flublok 18+yrs 09/29/2020   • Hepatitis A 10/03/2019, 07/15/2020   • Tdap 12/05/2014, 06/13/2019       Review of Systems   Constitutional: Negative for appetite change and fatigue.   HENT: Negative for nosebleeds and sore throat.    Eyes: Negative for blurred vision and visual disturbance.   Respiratory: Negative for shortness of breath and wheezing.    Cardiovascular: Negative for chest pain and leg swelling.   Gastrointestinal: Negative for abdominal distention and abdominal pain.   Endocrine: Negative for cold intolerance and polyuria.   Genitourinary: Negative for dysuria and hematuria.   Musculoskeletal: Negative for arthralgias and myalgias.   Skin: Positive for rash. Negative for color change.   Neurological: Negative for weakness and confusion.   Psychiatric/Behavioral: Negative for agitation and depressed mood.       Patient Active Problem List   Diagnosis   • UTI symptoms   • Tingling    • Temporal pain   • Non-smoker   • Cystitis, chronic   • Cystitis, acute   • Arthralgia of temporomandibular joint   • Anxiety   • Amenorrhea   • Acute pharyngitis   • Abdominal pain, acute, left lower quadrant   • Encounter for annual health examination   • Seasonal allergic rhinitis due to pollen   • GERD without esophagitis   • Encounter for screening for cardiovascular disorders   • Immunization deficiency   • Chronic idiopathic constipation   • Bloating   • Abnormal sense of taste   • Allergic reaction   • Other specified anemias       No Known Allergies      Current Outpatient Medications:   •  cetirizine (zyrTEC) 10 MG tablet, Take 10 mg by mouth Daily., Disp: , Rfl:   •  fluticasone (FLONASE) 50 MCG/ACT nasal spray, INSTILL 2 SPRAYS IN EACH NOSTRIL EVERY DAY AS DIRECTED BY PROVIDER, Disp: 16 g, Rfl: 11  •  pantoprazole (PROTONIX) 40 MG EC tablet, Take 1 tablet by mouth Daily., Disp: 90 tablet, Rfl: 3  •  Prenatal Vit-Fe Fum-FA-Omega (PRENATAL MULTI +DHA) 27-0.8-228 MG capsule, Take 1 capsule by mouth Daily., Disp: , Rfl:   •  TRI-LO-SPRINTEC 0.18/0.215/0.25 MG-25 MCG per tablet, Take 1 tablet by mouth Daily., Disp: , Rfl:     Past Medical History:   Diagnosis Date   • Abdominal pain, acute, left lower quadrant    • Acute pharyngitis    • Amenorrhea    • Anxiety    • Arthralgia of temporomandibular joint    • Cystitis, acute    • Cystitis, chronic    • Encounter for preconception consultation    • Encounter for preventive health examination    • Non-smoker     Never a smoker   • Temporal pain    • Tingling    • UTI symptoms    • Visit for gynecologic examination        Past Surgical History:   Procedure Laterality Date   • TONSILLECTOMY         Family History   Problem Relation Age of Onset   • Alcohol abuse Father    • Hypertension Father    • Diabetes type II Father    • Breast cancer Paternal Grandmother    • No Known Problems Other        Social History     Tobacco Use   • Smoking status: Former Smoker    • Smokeless tobacco: Never Used   • Tobacco comment: quit in 2017   Substance Use Topics   • Alcohol use: Yes     Comment: 7 or less drinks per week            Objective     Vitals:    03/24/22 1326   BP: 110/60   Pulse: 63   Temp: 97.8 °F (36.6 °C)   SpO2: 98%     Body mass index is 21.21 kg/m².    Physical Exam  Vitals reviewed.   Constitutional:       Appearance: She is well-developed. She is not diaphoretic.   HENT:      Head: Normocephalic and atraumatic.   Eyes:      General: No scleral icterus.     Pupils: Pupils are equal, round, and reactive to light.   Neck:      Thyroid: No thyromegaly.   Cardiovascular:      Rate and Rhythm: Normal rate and regular rhythm.      Heart sounds: No murmur heard.    No friction rub. No gallop.   Pulmonary:      Effort: Pulmonary effort is normal. No respiratory distress.      Breath sounds: No wheezing or rales.   Chest:      Chest wall: No tenderness.   Abdominal:      General: Bowel sounds are normal. There is no distension.      Palpations: Abdomen is soft.      Tenderness: There is no abdominal tenderness.   Musculoskeletal:         General: No deformity. Normal range of motion.   Lymphadenopathy:      Cervical: No cervical adenopathy.   Skin:     General: Skin is warm and dry.      Findings: No rash.   Neurological:      Cranial Nerves: No cranial nerve deficit.      Motor: No abnormal muscle tone.         Lab Results   Component Value Date    GLUCOSE 89 01/30/2020    BUN 8 01/30/2020    CREATININE 0.84 01/30/2020    EGFRIFNONA 78 01/30/2020    EGFRIFAFRI 94 01/30/2020    BCR 9.5 01/30/2020    K 4.4 01/30/2020    CO2 28.4 01/30/2020    CALCIUM 9.3 01/30/2020       WBC   Date Value Ref Range Status   09/05/2019 7.01 4.5 - 11.0 10*3/uL Final     RBC   Date Value Ref Range Status   09/05/2019 3.88 (L) 4.0 - 5.2 10*6/uL Final     Hemoglobin   Date Value Ref Range Status   09/06/2019 11.2 (L) 12.0 - 16.0 g/dL Final     Hematocrit   Date Value Ref Range Status   09/06/2019  34.3 (L) 36.0 - 46.0 % Final     MCV   Date Value Ref Range Status   09/05/2019 94.3 80.0 - 100.0 fL Final     MCH   Date Value Ref Range Status   09/05/2019 30.9 26.0 - 34.0 pg Final     MCHC   Date Value Ref Range Status   09/05/2019 32.8 31.0 - 37.0 g/dL Final     RDW   Date Value Ref Range Status   09/05/2019 13.9 12.0 - 16.8 % Final     MPV   Date Value Ref Range Status   09/05/2019 9.9 6.7 - 10.8 fL Final     Platelets   Date Value Ref Range Status   09/05/2019 168 140 - 440 10*3/uL Final     Neutrophil Rel %   Date Value Ref Range Status   09/05/2019 70.6 45 - 80 % Final     Lymphocyte Rel %   Date Value Ref Range Status   09/05/2019 19.8 15 - 50 % Final     Monocyte Rel %   Date Value Ref Range Status   09/05/2019 8.0 0 - 15 % Final     Eosinophil %   Date Value Ref Range Status   09/05/2019 1.1 0 - 7 % Final     Basophil Rel %   Date Value Ref Range Status   09/05/2019 0.1 0 - 2 % Final     Immature Grans %   Date Value Ref Range Status   09/05/2019 0.4 (H) 0 % Final     Neutrophils Absolute   Date Value Ref Range Status   09/05/2019 4.94 2.0 - 8.8 10*3/uL Final     Lymphocytes Absolute   Date Value Ref Range Status   09/05/2019 1.39 0.7 - 5.5 10*3/uL Final     Monocytes Absolute   Date Value Ref Range Status   09/05/2019 0.56 0.0 - 1.7 10*3/uL Final     Eosinophils Absolute   Date Value Ref Range Status   09/05/2019 0.08 0.0 - 0.8 10*3/uL Final     Basophils Absolute   Date Value Ref Range Status   09/05/2019 0.01 0.0 - 0.2 10*3/uL Final     Immature Grans, Absolute   Date Value Ref Range Status   09/05/2019 0.03 <1 10*3/uL Final     nRBC   Date Value Ref Range Status   09/05/2019 0 0 /100(WBC) Final       No results found for: HGBA1C    No results found for: [id number]    TSH   Date Value Ref Range Status   01/30/2020 2.930 0.270 - 4.200 uIU/mL Final       No results found for: CHOL  Lab Results   Component Value Date    TRIG 80 01/30/2020     Lab Results   Component Value Date    HDL 56 01/30/2020      Lab Results   Component Value Date    LDL 63 01/30/2020     Lab Results   Component Value Date    VLDL 16 01/30/2020     No results found for: LDLHDL      Procedures    Assessment/Plan   Problems Addressed this Visit     Allergic reaction    Relevant Orders    CBC & Differential    Comprehensive Metabolic Panel    Alpha-Gal IgE Panel    Chronic idiopathic constipation    Encounter for annual health examination - Primary    GERD without esophagitis    Relevant Medications    pantoprazole (PROTONIX) 40 MG EC tablet    Other specified anemias    Relevant Orders    Ferritin    Iron    Vitamin B12    TSH Rfx On Abnormal To Free T4      Other Visit Diagnoses     Encounter for lipid screening for cardiovascular disease        Relevant Orders    Lipid Panel With / Chol / HDL Ratio      Diagnoses       Codes Comments    Encounter for annual health examination    -  Primary ICD-10-CM: Z00.00  ICD-9-CM: V70.0     GERD without esophagitis     ICD-10-CM: K21.9  ICD-9-CM: 530.81     Chronic idiopathic constipation     ICD-10-CM: K59.04  ICD-9-CM: 564.00     Allergic reaction, initial encounter     ICD-10-CM: T78.40XA  ICD-9-CM: 995.3     Anemia due to other cause, not classified     ICD-10-CM: D64.89  ICD-9-CM: 285.8     Encounter for lipid screening for cardiovascular disease     ICD-10-CM: Z13.220, Z13.6  ICD-9-CM: V77.91, V81.2       Allergic reaction.  New problem.  Check alpha gal antibodies.  Allergy w/u neg per pt other than this.    DIANN.  Controlled.  Continue med.  Rf pantoprazole.    CIC.   Check TSH. CMP.  Use miralax prn.   Preventive Counseling:  Encouraged to stay active.  Covid vaccine UTD.  HEP A UTD.  Pneumovax UTD.  Colonoscopy UTD.    Dentist UTD.  Optho UTD.      Orders Placed This Encounter   Procedures   • Comprehensive Metabolic Panel     Order Specific Question:   Release to patient     Answer:   Immediate   • Lipid Panel With / Chol / HDL Ratio     Order Specific Question:   Release to patient      Answer:   Immediate   • Ferritin   • Iron   • Vitamin B12     Order Specific Question:   Release to patient     Answer:   Immediate   • TSH Rfx On Abnormal To Free T4     Order Specific Question:   Release to patient     Answer:   Immediate   • Alpha-Gal IgE Panel     Order Specific Question:   Release to patient     Answer:   Immediate   • CBC & Differential     Order Specific Question:   Manual Differential     Answer:   No       Current Outpatient Medications   Medication Sig Dispense Refill   • cetirizine (zyrTEC) 10 MG tablet Take 10 mg by mouth Daily.     • fluticasone (FLONASE) 50 MCG/ACT nasal spray INSTILL 2 SPRAYS IN EACH NOSTRIL EVERY DAY AS DIRECTED BY PROVIDER 16 g 11   • pantoprazole (PROTONIX) 40 MG EC tablet Take 1 tablet by mouth Daily. 90 tablet 3   • Prenatal Vit-Fe Fum-FA-Omega (PRENATAL MULTI +DHA) 27-0.8-228 MG capsule Take 1 capsule by mouth Daily.     • TRI-LO-SPRINTEC 0.18/0.215/0.25 MG-25 MCG per tablet Take 1 tablet by mouth Daily.       No current facility-administered medications for this visit.       Return in about 1 year (around 3/24/2023).    There are no Patient Instructions on file for this visit.

## 2022-03-31 LAB
ALBUMIN SERPL-MCNC: 4.4 G/DL (ref 3.8–4.8)
ALBUMIN/GLOB SERPL: 2 {RATIO} (ref 1.2–2.2)
ALP SERPL-CCNC: 43 IU/L (ref 44–121)
ALPHA-GAL IGE QN: <0.1 KU/L
ALT SERPL-CCNC: 12 IU/L (ref 0–32)
AST SERPL-CCNC: 27 IU/L (ref 0–40)
BASOPHILS # BLD AUTO: 0.1 X10E3/UL (ref 0–0.2)
BASOPHILS NFR BLD AUTO: 1 %
BEEF IGE QN: <0.1 KU/L
BILIRUB SERPL-MCNC: 0.3 MG/DL (ref 0–1.2)
BUN SERPL-MCNC: 25 MG/DL (ref 6–20)
BUN/CREAT SERPL: 26 (ref 9–23)
CALCIUM SERPL-MCNC: 9.2 MG/DL (ref 8.7–10.2)
CHLORIDE SERPL-SCNC: 101 MMOL/L (ref 96–106)
CHOLEST SERPL-MCNC: 175 MG/DL (ref 100–199)
CHOLEST/HDLC SERPL: 2.2 RATIO (ref 0–4.4)
CO2 SERPL-SCNC: 25 MMOL/L (ref 20–29)
CONV CLASS DESCRIPTION: NORMAL
CREAT SERPL-MCNC: 0.95 MG/DL (ref 0.57–1)
EGFRCR SERPLBLD CKD-EPI 2021: 80 ML/MIN/1.73
EOSINOPHIL # BLD AUTO: 0.1 X10E3/UL (ref 0–0.4)
EOSINOPHIL NFR BLD AUTO: 2 %
ERYTHROCYTE [DISTWIDTH] IN BLOOD BY AUTOMATED COUNT: 11.9 % (ref 11.7–15.4)
FERRITIN SERPL-MCNC: 27 NG/ML (ref 15–150)
GLOBULIN SER CALC-MCNC: 2.2 G/DL (ref 1.5–4.5)
GLUCOSE SERPL-MCNC: 80 MG/DL (ref 65–99)
HCT VFR BLD AUTO: 36.5 % (ref 34–46.6)
HDLC SERPL-MCNC: 80 MG/DL
HGB BLD-MCNC: 12.2 G/DL (ref 11.1–15.9)
IGE SERPL-ACNC: 54 IU/ML (ref 6–495)
IMM GRANULOCYTES # BLD AUTO: 0 X10E3/UL (ref 0–0.1)
IMM GRANULOCYTES NFR BLD AUTO: 0 %
IRON SERPL-MCNC: 140 UG/DL (ref 27–159)
LAMB IGE QN: <0.1 KU/L
LDLC SERPL CALC-MCNC: 80 MG/DL (ref 0–99)
LYMPHOCYTES # BLD AUTO: 1.9 X10E3/UL (ref 0.7–3.1)
LYMPHOCYTES NFR BLD AUTO: 27 %
MCH RBC QN AUTO: 32 PG (ref 26.6–33)
MCHC RBC AUTO-ENTMCNC: 33.4 G/DL (ref 31.5–35.7)
MCV RBC AUTO: 96 FL (ref 79–97)
MONOCYTES # BLD AUTO: 0.5 X10E3/UL (ref 0.1–0.9)
MONOCYTES NFR BLD AUTO: 8 %
NEUTROPHILS # BLD AUTO: 4.5 X10E3/UL (ref 1.4–7)
NEUTROPHILS NFR BLD AUTO: 62 %
PLATELET # BLD AUTO: 198 X10E3/UL (ref 150–450)
PORK IGE QN: <0.1 KU/L
POTASSIUM SERPL-SCNC: 4.8 MMOL/L (ref 3.5–5.2)
PROT SERPL-MCNC: 6.6 G/DL (ref 6–8.5)
RBC # BLD AUTO: 3.81 X10E6/UL (ref 3.77–5.28)
SODIUM SERPL-SCNC: 140 MMOL/L (ref 134–144)
TRIGL SERPL-MCNC: 80 MG/DL (ref 0–149)
TSH SERPL DL<=0.005 MIU/L-ACNC: 1.48 UIU/ML (ref 0.45–4.5)
VIT B12 SERPL-MCNC: 383 PG/ML (ref 232–1245)
VLDLC SERPL CALC-MCNC: 15 MG/DL (ref 5–40)
WBC # BLD AUTO: 7.2 X10E3/UL (ref 3.4–10.8)

## 2022-12-08 ENCOUNTER — OFFICE VISIT (OUTPATIENT)
Dept: FAMILY MEDICINE CLINIC | Facility: CLINIC | Age: 37
End: 2022-12-08

## 2022-12-08 VITALS
HEART RATE: 71 BPM | TEMPERATURE: 97.8 F | WEIGHT: 126.8 LBS | SYSTOLIC BLOOD PRESSURE: 98 MMHG | HEIGHT: 64 IN | OXYGEN SATURATION: 100 % | DIASTOLIC BLOOD PRESSURE: 80 MMHG | BODY MASS INDEX: 21.65 KG/M2

## 2022-12-08 DIAGNOSIS — M25.50 POLYARTHRALGIA: Primary | ICD-10-CM

## 2022-12-08 DIAGNOSIS — M77.12 LATERAL EPICONDYLITIS OF BOTH ELBOWS: ICD-10-CM

## 2022-12-08 DIAGNOSIS — M77.11 LATERAL EPICONDYLITIS OF BOTH ELBOWS: ICD-10-CM

## 2022-12-08 PROCEDURE — 99214 OFFICE O/P EST MOD 30 MIN: CPT | Performed by: FAMILY MEDICINE

## 2022-12-08 RX ORDER — MELOXICAM 15 MG/1
15 TABLET ORAL DAILY
Qty: 30 TABLET | Refills: 2 | Status: SHIPPED | OUTPATIENT
Start: 2022-12-08 | End: 2023-02-04 | Stop reason: SDUPTHER

## 2022-12-08 NOTE — PROGRESS NOTES
Chief Complaint   Patient presents with   • Arm Pain   • Hand Pain       Subjective   Connie Mike is a 37 y.o. female.     History of Present Illness   C/o L elbow pain and progressed to R elbow.  Elbows some better and B hands now occurring.  On ibuprofen and tumeric prn.  No help.   Going on 4 months or so.    The following portions of the patient's history were reviewed and updated as appropriate: allergies, current medications, past family history, past medical history, past social history, past surgical history and problem list.    Review of Systems   Constitutional: Negative for appetite change and fatigue.   HENT: Negative for nosebleeds and sore throat.    Eyes: Negative for blurred vision and visual disturbance.   Respiratory: Negative for shortness of breath and wheezing.    Cardiovascular: Negative for chest pain and leg swelling.   Gastrointestinal: Negative for abdominal distention, abdominal pain, constipation and diarrhea.   Endocrine: Negative for cold intolerance and polyuria.   Genitourinary: Negative for dysuria and hematuria.   Musculoskeletal: Positive for arthralgias. Negative for myalgias.   Skin: Negative for color change and rash.   Neurological: Negative for weakness and confusion.   Psychiatric/Behavioral: Negative for agitation and depressed mood.       Patient Active Problem List   Diagnosis   • UTI symptoms   • Tingling   • Temporal pain   • Non-smoker   • Cystitis, chronic   • Cystitis, acute   • Arthralgia of temporomandibular joint   • Anxiety   • Amenorrhea   • Acute pharyngitis   • Abdominal pain, acute, left lower quadrant   • Encounter for annual health examination   • Seasonal allergic rhinitis due to pollen   • GERD without esophagitis   • Encounter for screening for cardiovascular disorders   • Immunization deficiency   • Chronic idiopathic constipation   • Bloating   • Abnormal sense of taste   • Allergic reaction   • Other specified anemias   • Polyarthralgia   •  Lateral epicondylitis of both elbows       No Known Allergies      Current Outpatient Medications:   •  cetirizine (zyrTEC) 10 MG tablet, Take 10 mg by mouth Daily., Disp: , Rfl:   •  fluticasone (FLONASE) 50 MCG/ACT nasal spray, INSTILL 2 SPRAYS IN EACH NOSTRIL EVERY DAY AS DIRECTED BY PROVIDER, Disp: 16 g, Rfl: 11  •  pantoprazole (PROTONIX) 40 MG EC tablet, Take 1 tablet by mouth Daily., Disp: 90 tablet, Rfl: 3  •  Prenatal Vit-Fe Fum-FA-Omega (PRENATAL MULTI +DHA) 27-0.8-228 MG capsule, Take 1 capsule by mouth Daily., Disp: , Rfl:   •  TRI-LO-SPRINTEC 0.18/0.215/0.25 MG-25 MCG per tablet, Take 1 tablet by mouth Daily., Disp: , Rfl:   •  meloxicam (MOBIC) 15 MG tablet, Take 1 tablet by mouth Daily., Disp: 30 tablet, Rfl: 2    Past Medical History:   Diagnosis Date   • Abdominal pain, acute, left lower quadrant    • Acute pharyngitis    • Amenorrhea    • Anxiety    • Arthralgia of temporomandibular joint    • Cystitis, acute    • Cystitis, chronic    • Encounter for preconception consultation    • Encounter for preventive health examination    • Non-smoker     Never a smoker   • Temporal pain    • Tingling    • UTI symptoms    • Visit for gynecologic examination        Past Surgical History:   Procedure Laterality Date   • ADENOIDECTOMY     • EYE SURGERY     • TONSILLECTOMY     • TONSILLECTOMY         Family History   Problem Relation Age of Onset   • Alcohol abuse Father    • Hypertension Father    • Diabetes type II Father    • Heart disease Father    • Breast cancer Paternal Grandmother    • No Known Problems Other        Social History     Tobacco Use   • Smoking status: Former     Packs/day: 0.25     Years: 5.00     Pack years: 1.25     Types: Cigarettes     Quit date: 2017     Years since quittin.0   • Smokeless tobacco: Never   • Tobacco comments:     quit in 2017   Substance Use Topics   • Alcohol use: Yes     Alcohol/week: 5.0 standard drinks     Types: 5 Cans of beer per week     Comment:  Minimal consumption            Objective     Vitals:    12/08/22 1321   BP: 98/80   Pulse: 71   Temp: 97.8 °F (36.6 °C)   SpO2: 100%     Body mass index is 21.75 kg/m².    Physical Exam  Vitals reviewed.   Constitutional:       Appearance: She is well-developed. She is not diaphoretic.   HENT:      Head: Normocephalic and atraumatic.   Eyes:      General: No scleral icterus.     Pupils: Pupils are equal, round, and reactive to light.   Neck:      Thyroid: No thyromegaly.   Cardiovascular:      Rate and Rhythm: Normal rate and regular rhythm.      Heart sounds: No murmur heard.    No friction rub. No gallop.   Pulmonary:      Effort: Pulmonary effort is normal. No respiratory distress.      Breath sounds: No wheezing or rales.   Chest:      Chest wall: No tenderness.   Abdominal:      General: Bowel sounds are normal. There is no distension.      Palpations: Abdomen is soft.      Tenderness: There is no abdominal tenderness.   Musculoskeletal:         General: No deformity. Normal range of motion.      Comments: TTP over B lateral epicondyles   Lymphadenopathy:      Cervical: No cervical adenopathy.   Skin:     General: Skin is warm and dry.      Findings: No rash.   Neurological:      Cranial Nerves: No cranial nerve deficit.      Motor: No abnormal muscle tone.         Lab Results   Component Value Date    GLUCOSE 80 03/24/2022    BUN 25 (H) 03/24/2022    CREATININE 0.95 03/24/2022    EGFRIFNONA 78 01/30/2020    EGFRIFAFRI 94 01/30/2020    BCR 26 (H) 03/24/2022    K 4.8 03/24/2022    CO2 25 03/24/2022    CALCIUM 9.2 03/24/2022    PROTENTOTREF 6.6 03/24/2022    ALBUMIN 4.4 03/24/2022    LABIL2 2.0 03/24/2022    AST 27 03/24/2022    ALT 12 03/24/2022       WBC   Date Value Ref Range Status   03/24/2022 7.2 3.4 - 10.8 x10E3/uL Final   09/05/2019 7.01 4.5 - 11.0 10*3/uL Final     RBC   Date Value Ref Range Status   03/24/2022 3.81 3.77 - 5.28 x10E6/uL Final   09/05/2019 3.88 (L) 4.0 - 5.2 10*6/uL Final     Hemoglobin    Date Value Ref Range Status   03/24/2022 12.2 11.1 - 15.9 g/dL Final   09/06/2019 11.2 (L) 12.0 - 16.0 g/dL Final     Hematocrit   Date Value Ref Range Status   03/24/2022 36.5 34.0 - 46.6 % Final   09/06/2019 34.3 (L) 36.0 - 46.0 % Final     MCV   Date Value Ref Range Status   03/24/2022 96 79 - 97 fL Final   09/05/2019 94.3 80.0 - 100.0 fL Final     MCH   Date Value Ref Range Status   03/24/2022 32.0 26.6 - 33.0 pg Final   09/05/2019 30.9 26.0 - 34.0 pg Final     MCHC   Date Value Ref Range Status   03/24/2022 33.4 31.5 - 35.7 g/dL Final   09/05/2019 32.8 31.0 - 37.0 g/dL Final     RDW   Date Value Ref Range Status   03/24/2022 11.9 11.7 - 15.4 % Final   09/05/2019 13.9 12.0 - 16.8 % Final     MPV   Date Value Ref Range Status   09/05/2019 9.9 6.7 - 10.8 fL Final     Platelets   Date Value Ref Range Status   03/24/2022 198 150 - 450 x10E3/uL Final   09/05/2019 168 140 - 440 10*3/uL Final     Neutrophil Rel %   Date Value Ref Range Status   03/24/2022 62 Not Estab. % Final   09/05/2019 70.6 45 - 80 % Final     Lymphocyte Rel %   Date Value Ref Range Status   03/24/2022 27 Not Estab. % Final   09/05/2019 19.8 15 - 50 % Final     Monocyte Rel %   Date Value Ref Range Status   03/24/2022 8 Not Estab. % Final   09/05/2019 8.0 0 - 15 % Final     Eosinophil Rel %   Date Value Ref Range Status   03/24/2022 2 Not Estab. % Final     Eosinophil %   Date Value Ref Range Status   09/05/2019 1.1 0 - 7 % Final     Basophil Rel %   Date Value Ref Range Status   03/24/2022 1 Not Estab. % Final   09/05/2019 0.1 0 - 2 % Final     Immature Grans %   Date Value Ref Range Status   09/05/2019 0.4 (H) 0 % Final     Neutrophils Absolute   Date Value Ref Range Status   03/24/2022 4.5 1.4 - 7.0 x10E3/uL Final   09/05/2019 4.94 2.0 - 8.8 10*3/uL Final     Lymphocytes Absolute   Date Value Ref Range Status   03/24/2022 1.9 0.7 - 3.1 x10E3/uL Final   09/05/2019 1.39 0.7 - 5.5 10*3/uL Final     Monocytes Absolute   Date Value Ref Range Status    03/24/2022 0.5 0.1 - 0.9 x10E3/uL Final   09/05/2019 0.56 0.0 - 1.7 10*3/uL Final     Eosinophils Absolute   Date Value Ref Range Status   03/24/2022 0.1 0.0 - 0.4 x10E3/uL Final   09/05/2019 0.08 0.0 - 0.8 10*3/uL Final     Basophils Absolute   Date Value Ref Range Status   03/24/2022 0.1 0.0 - 0.2 x10E3/uL Final   09/05/2019 0.01 0.0 - 0.2 10*3/uL Final     Immature Grans, Absolute   Date Value Ref Range Status   09/05/2019 0.03 <1 10*3/uL Final     nRBC   Date Value Ref Range Status   09/05/2019 0 0 /100(WBC) Final       No results found for: HGBA1C    Lab Results   Component Value Date    YNISNSFD51 383 03/24/2022       TSH   Date Value Ref Range Status   03/24/2022 1.480 0.450 - 4.500 uIU/mL Final       No results found for: CHOL  Lab Results   Component Value Date    TRIG 80 03/24/2022     Lab Results   Component Value Date    HDL 80 03/24/2022     Lab Results   Component Value Date    LDL 80 03/24/2022     Lab Results   Component Value Date    VLDL 15 03/24/2022     No results found for: LDLHDL      Procedures    Assessment & Plan   Problems Addressed this Visit     Lateral epicondylitis of both elbows    Polyarthralgia - Primary    Relevant Medications    meloxicam (MOBIC) 15 MG tablet    Other Relevant Orders    Rheumatoid Factor    NOÉ by IFA, Reflex 9-biomarkers profile    Cyclic Citrul Peptide Antibody, IgG / IgA    TSH Rfx On Abnormal To Free T4    Sedimentation Rate    CBC & Differential    Comprehensive Metabolic Panel    C-reactive Protein   Diagnoses       Codes Comments    Polyarthralgia    -  Primary ICD-10-CM: M25.50  ICD-9-CM: 719.49     Lateral epicondylitis of both elbows     ICD-10-CM: M77.11, M77.12  ICD-9-CM: 726.32         Polyarthralgia.  New problem.  Check arthritis panel as above.  Start meloxicam.   B lat epicondylitis.  B tennis elbow bands.    Orders Placed This Encounter   Procedures   • Rheumatoid Factor     Order Specific Question:   Release to patient     Answer:   Routine  Release   • NOÉ by IFA, Reflex 9-biomarkers profile     Order Specific Question:   Release to patient     Answer:   Routine Release   • Cyclic Citrul Peptide Antibody, IgG / IgA     Order Specific Question:   Release to patient     Answer:   Routine Release   • TSH Rfx On Abnormal To Free T4     Order Specific Question:   Release to patient     Answer:   Routine Release   • Sedimentation Rate     Order Specific Question:   Release to patient     Answer:   Routine Release   • Comprehensive Metabolic Panel     Order Specific Question:   Release to patient     Answer:   Routine Release   • C-reactive Protein     Order Specific Question:   Release to patient     Answer:   Routine Release   • CBC & Differential     Order Specific Question:   Manual Differential     Answer:   No       Current Outpatient Medications   Medication Sig Dispense Refill   • cetirizine (zyrTEC) 10 MG tablet Take 10 mg by mouth Daily.     • fluticasone (FLONASE) 50 MCG/ACT nasal spray INSTILL 2 SPRAYS IN EACH NOSTRIL EVERY DAY AS DIRECTED BY PROVIDER 16 g 11   • pantoprazole (PROTONIX) 40 MG EC tablet Take 1 tablet by mouth Daily. 90 tablet 3   • Prenatal Vit-Fe Fum-FA-Omega (PRENATAL MULTI +DHA) 27-0.8-228 MG capsule Take 1 capsule by mouth Daily.     • TRI-LO-SPRINTEC 0.18/0.215/0.25 MG-25 MCG per tablet Take 1 tablet by mouth Daily.     • meloxicam (MOBIC) 15 MG tablet Take 1 tablet by mouth Daily. 30 tablet 2     No current facility-administered medications for this visit.       Connie Mike had no medications administered during this visit.    Return in about 3 months (around 3/8/2023).    There are no Patient Instructions on file for this visit.

## 2022-12-13 LAB
ALBUMIN SERPL-MCNC: 4.6 G/DL (ref 3.5–5.2)
ALBUMIN/GLOB SERPL: 2.2 G/DL
ALP SERPL-CCNC: 45 U/L (ref 39–117)
ALT SERPL-CCNC: 11 U/L (ref 1–33)
ANA SER QL IF: NEGATIVE
AST SERPL-CCNC: 29 U/L (ref 1–32)
BASOPHILS # BLD AUTO: 0.03 10*3/MM3 (ref 0–0.2)
BASOPHILS NFR BLD AUTO: 0.6 % (ref 0–1.5)
BILIRUB SERPL-MCNC: 0.3 MG/DL (ref 0–1.2)
BUN SERPL-MCNC: 21 MG/DL (ref 6–20)
BUN/CREAT SERPL: 24.4 (ref 7–25)
CALCIUM SERPL-MCNC: 9.5 MG/DL (ref 8.6–10.5)
CCP IGA+IGG SERPL IA-ACNC: 0 UNITS (ref 0–19)
CHLORIDE SERPL-SCNC: 101 MMOL/L (ref 98–107)
CO2 SERPL-SCNC: 27 MMOL/L (ref 22–29)
CREAT SERPL-MCNC: 0.86 MG/DL (ref 0.57–1)
CRP SERPL-MCNC: 0.52 MG/DL (ref 0–0.5)
EGFRCR SERPLBLD CKD-EPI 2021: 89.4 ML/MIN/1.73
EOSINOPHIL # BLD AUTO: 0.09 10*3/MM3 (ref 0–0.4)
EOSINOPHIL NFR BLD AUTO: 1.9 % (ref 0.3–6.2)
ERYTHROCYTE [DISTWIDTH] IN BLOOD BY AUTOMATED COUNT: 11.7 % (ref 12.3–15.4)
ERYTHROCYTE [SEDIMENTATION RATE] IN BLOOD BY WESTERGREN METHOD: 5 MM/HR (ref 0–20)
GLOBULIN SER CALC-MCNC: 2.1 GM/DL
GLUCOSE SERPL-MCNC: 95 MG/DL (ref 65–99)
HCT VFR BLD AUTO: 37.1 % (ref 34–46.6)
HGB BLD-MCNC: 13.2 G/DL (ref 12–15.9)
IMM GRANULOCYTES # BLD AUTO: 0.01 10*3/MM3 (ref 0–0.05)
IMM GRANULOCYTES NFR BLD AUTO: 0.2 % (ref 0–0.5)
LABORATORY COMMENT REPORT: NORMAL
LYMPHOCYTES # BLD AUTO: 0.9 10*3/MM3 (ref 0.7–3.1)
LYMPHOCYTES NFR BLD AUTO: 19.1 % (ref 19.6–45.3)
MCH RBC QN AUTO: 34.4 PG (ref 26.6–33)
MCHC RBC AUTO-ENTMCNC: 35.6 G/DL (ref 31.5–35.7)
MCV RBC AUTO: 96.6 FL (ref 79–97)
MONOCYTES # BLD AUTO: 0.61 10*3/MM3 (ref 0.1–0.9)
MONOCYTES NFR BLD AUTO: 12.9 % (ref 5–12)
NEUTROPHILS # BLD AUTO: 3.08 10*3/MM3 (ref 1.7–7)
NEUTROPHILS NFR BLD AUTO: 65.3 % (ref 42.7–76)
NRBC BLD AUTO-RTO: 0 /100 WBC (ref 0–0.2)
PLATELET # BLD AUTO: 186 10*3/MM3 (ref 140–450)
POTASSIUM SERPL-SCNC: 4.3 MMOL/L (ref 3.5–5.2)
PROT SERPL-MCNC: 6.7 G/DL (ref 6–8.5)
RBC # BLD AUTO: 3.84 10*6/MM3 (ref 3.77–5.28)
RHEUMATOID FACT SERPL-ACNC: <10 IU/ML
SODIUM SERPL-SCNC: 139 MMOL/L (ref 136–145)
TSH SERPL DL<=0.005 MIU/L-ACNC: 2.58 UIU/ML (ref 0.27–4.2)
WBC # BLD AUTO: 4.72 10*3/MM3 (ref 3.4–10.8)

## 2023-02-04 DIAGNOSIS — M25.50 POLYARTHRALGIA: ICD-10-CM

## 2023-02-04 RX ORDER — MELOXICAM 15 MG/1
15 TABLET ORAL DAILY
Qty: 30 TABLET | Refills: 2 | Status: SHIPPED | OUTPATIENT
Start: 2023-02-04 | End: 2023-03-14 | Stop reason: SDUPTHER

## 2023-03-02 RX ORDER — PANTOPRAZOLE SODIUM 40 MG/1
40 TABLET, DELAYED RELEASE ORAL DAILY
Qty: 90 TABLET | Refills: 3 | Status: SHIPPED | OUTPATIENT
Start: 2023-03-02

## 2023-03-14 ENCOUNTER — OFFICE VISIT (OUTPATIENT)
Dept: FAMILY MEDICINE CLINIC | Facility: CLINIC | Age: 38
End: 2023-03-14
Payer: COMMERCIAL

## 2023-03-14 VITALS
BODY MASS INDEX: 21.27 KG/M2 | WEIGHT: 124 LBS | HEART RATE: 56 BPM | DIASTOLIC BLOOD PRESSURE: 70 MMHG | TEMPERATURE: 97.6 F | SYSTOLIC BLOOD PRESSURE: 106 MMHG | OXYGEN SATURATION: 100 %

## 2023-03-14 DIAGNOSIS — M25.50 POLYARTHRALGIA: ICD-10-CM

## 2023-03-14 DIAGNOSIS — S69.91XD INJURY OF RIGHT WRIST, SUBSEQUENT ENCOUNTER: Primary | ICD-10-CM

## 2023-03-14 PROCEDURE — 99213 OFFICE O/P EST LOW 20 MIN: CPT | Performed by: STUDENT IN AN ORGANIZED HEALTH CARE EDUCATION/TRAINING PROGRAM

## 2023-03-14 RX ORDER — CIPROFLOXACIN HYDROCHLORIDE 3.5 MG/ML
SOLUTION/ DROPS TOPICAL
COMMUNITY
Start: 2023-02-10

## 2023-03-14 RX ORDER — MELOXICAM 15 MG/1
15 TABLET ORAL DAILY
Qty: 30 TABLET | Refills: 2 | Status: SHIPPED | OUTPATIENT
Start: 2023-03-14

## 2023-03-14 NOTE — PROGRESS NOTES
"Chief Complaint  Upper Extremity Issue (Pain in right hand started hurting last week after patient tripped and caught her self )    Subjective        Connie Mike presents to [facility]  History of Present Illness     Answers for HPI/ROS submitted by the patient on 3/13/2023  What is the primary reason for your visit?: Physical    Having issues with her right wrist. Fell and caught herself when she was on vacation. Had soreness that initially went away but works as a . Did a hard workout and the symptoms have come back. Took the weekend off and her symptoms improved some.       Objective   Vital Signs:  /70 (BP Location: Right arm, Patient Position: Sitting, Cuff Size: Large Adult)   Pulse 56   Temp 97.6 °F (36.4 °C)   Wt 56.2 kg (124 lb)   SpO2 100%   BMI 21.27 kg/m²   Estimated body mass index is 21.27 kg/m² as calculated from the following:    Height as of 12/8/22: 162.6 cm (64.02\").    Weight as of this encounter: 56.2 kg (124 lb).       BMI is within normal parameters. No other follow-up for BMI required.      Physical Exam  Vitals and nursing note reviewed.   Constitutional:       General: She is not in acute distress.     Appearance: Normal appearance. She is not ill-appearing.   HENT:      Head: Normocephalic and atraumatic.      Nose: Nose normal.      Mouth/Throat:      Mouth: Mucous membranes are moist.   Eyes:      Extraocular Movements: Extraocular movements intact.      Conjunctiva/sclera: Conjunctivae normal.   Cardiovascular:      Rate and Rhythm: Normal rate and regular rhythm.      Heart sounds: Normal heart sounds. No murmur heard.    No gallop.   Pulmonary:      Effort: Pulmonary effort is normal. No respiratory distress.      Breath sounds: Normal breath sounds. No stridor. No wheezing, rhonchi or rales.   Chest:      Chest wall: No tenderness.   Skin:     General: Skin is warm and dry.   Neurological:      General: No focal deficit " present.      Mental Status: She is alert and oriented to person, place, and time. Mental status is at baseline.   Psychiatric:         Mood and Affect: Mood normal.         Behavior: Behavior normal.        Result Review :                   Assessment and Plan   Diagnoses and all orders for this visit:    1. Injury of right wrist, subsequent encounter (Primary)  -     XR Wrist 3+ View Right; Future  -     Ambulatory Referral to Physical Therapy Evaluate and treat    2. Polyarthralgia  -     meloxicam (MOBIC) 15 MG tablet; Take 1 tablet by mouth Daily.  Dispense: 30 tablet; Refill: 2             Follow Up   No follow-ups on file.  Patient was given instructions and counseling regarding her condition or for health maintenance advice. Please see specific information pulled into the AVS if appropriate.

## 2023-03-17 ENCOUNTER — CLINICAL SUPPORT (OUTPATIENT)
Dept: FAMILY MEDICINE CLINIC | Facility: CLINIC | Age: 38
End: 2023-03-17
Payer: COMMERCIAL

## 2023-04-11 ENCOUNTER — OFFICE VISIT (OUTPATIENT)
Dept: FAMILY MEDICINE CLINIC | Facility: CLINIC | Age: 38
End: 2023-04-11
Payer: COMMERCIAL

## 2023-04-11 VITALS
WEIGHT: 125 LBS | BODY MASS INDEX: 21.45 KG/M2 | SYSTOLIC BLOOD PRESSURE: 111 MMHG | DIASTOLIC BLOOD PRESSURE: 65 MMHG | HEART RATE: 62 BPM | TEMPERATURE: 98 F | OXYGEN SATURATION: 99 %

## 2023-04-11 DIAGNOSIS — L60.8 DEFORMITY OF NAIL BED: Primary | ICD-10-CM

## 2023-04-11 DIAGNOSIS — M25.531 RIGHT WRIST PAIN: ICD-10-CM

## 2023-04-11 DIAGNOSIS — S69.91XD INJURY OF RIGHT WRIST, SUBSEQUENT ENCOUNTER: ICD-10-CM

## 2023-04-11 DIAGNOSIS — L60.9 NAIL ABNORMALITY: ICD-10-CM

## 2023-04-11 RX ORDER — SULFAMETHOXAZOLE AND TRIMETHOPRIM 800; 160 MG/1; MG/1
1 TABLET ORAL 2 TIMES DAILY
Qty: 20 TABLET | Refills: 0 | Status: SHIPPED | OUTPATIENT
Start: 2023-04-11

## 2023-04-11 NOTE — PROGRESS NOTES
"Chief Complaint  Nail Problem (Nail fell off and now it is swollen )    Subjective        Connie Mike presents to [facility]  History of Present Illness     Has been treating nail fungus. Hurt thumb on Saturday and nail has lifted from the skin. Now gets nagged on things and does bleed.     Wrist xray came back normal last time but wrist is still very painful. Is planning to try PT or deep tissue massage. Also has concerns about MRI.     Objective   Vital Signs:  /65 (BP Location: Right arm, Patient Position: Sitting, Cuff Size: Large Adult)   Pulse 62   Temp 98 °F (36.7 °C)   Wt 56.7 kg (125 lb)   SpO2 99%   BMI 21.45 kg/m²   Estimated body mass index is 21.45 kg/m² as calculated from the following:    Height as of 12/8/22: 162.6 cm (64.02\").    Weight as of this encounter: 56.7 kg (125 lb).       BMI is within normal parameters. No other follow-up for BMI required.      Physical Exam  Vitals and nursing note reviewed.   Constitutional:       General: She is not in acute distress.     Appearance: Normal appearance. She is not ill-appearing.   HENT:      Head: Normocephalic and atraumatic.      Nose: Nose normal.      Mouth/Throat:      Mouth: Mucous membranes are moist.   Eyes:      Extraocular Movements: Extraocular movements intact.      Conjunctiva/sclera: Conjunctivae normal.   Cardiovascular:      Rate and Rhythm: Normal rate and regular rhythm.   Pulmonary:      Effort: Pulmonary effort is normal.   Skin:     General: Skin is warm and dry.   Neurological:      General: No focal deficit present.      Mental Status: She is alert and oriented to person, place, and time. Mental status is at baseline.   Psychiatric:         Mood and Affect: Mood normal.         Behavior: Behavior normal.           File Link    Scan on 4/11/2023 1612 by Ruba Goldman DO        [facility] Information    Document ID File Type Document Type Description   [id number] Image CLINICAL " PHOTOGRAPHS OTHER      Import Information    Attached At Date Time User Dept   Encounter Level 4/11/2023  4:12 PM Ruba Goldman DO [facility] [place] [facility]     Encounter    Office Visit on 4/11/23 with Ruba Goldman DO           Result Review :                   Assessment and Plan   Diagnoses and all orders for this visit:    1. Deformity of nail bed (Primary)  -     Ambulatory Referral to Dermatology    2. Nail abnormality  -     sulfamethoxazole-trimethoprim (Bactrim DS) 800-160 MG per tablet; Take 1 tablet by mouth 2 (Two) Times a Day.  Dispense: 20 tablet; Refill: 0    3. Right wrist pain  -     Ambulatory Referral to Orthopedic Surgery  -     MRI Wrist Right Arthrogram; Future    4. Injury of right wrist, subsequent encounter  -     Ambulatory Referral to Orthopedic Surgery  -     MRI Wrist Right Arthrogram; Future      Will treat cellulitis with bactrim.   Will see derm to help with nail removal.     Right wrist pain for greater than 2 months. Xray normal. Pain continues. Making her job very difficult. Will get MRI and refer to ortho.        Follow Up   Return if symptoms worsen or fail to improve.  Patient was given instructions and counseling regarding her condition or for health maintenance advice. Please see specific information pulled into the AVS if appropriate.       Answers for HPI/ROS submitted by the patient on 4/11/2023  Please describe your symptoms.: Infected nail, pulled away from nail bed  Have you had these symptoms before?: No  How long have you been having these symptoms?: 1-4 days  What is the primary reason for your visit?: Other

## 2023-04-17 ENCOUNTER — OFFICE VISIT (OUTPATIENT)
Dept: ORTHOPEDIC SURGERY | Facility: CLINIC | Age: 38
End: 2023-04-17
Payer: COMMERCIAL

## 2023-04-17 VITALS — WEIGHT: 123 LBS | BODY MASS INDEX: 21 KG/M2 | TEMPERATURE: 98.4 F | HEIGHT: 64 IN

## 2023-04-17 DIAGNOSIS — M25.531 RIGHT WRIST PAIN: Primary | ICD-10-CM

## 2023-04-17 DIAGNOSIS — M25.539 PAIN OF ULNAR SIDE OF WRIST: ICD-10-CM

## 2023-04-17 RX ORDER — CLOBETASOL PROPIONATE 0.5 MG/G
OINTMENT TOPICAL
COMMUNITY
Start: 2023-04-12

## 2023-04-17 NOTE — PROGRESS NOTES
Patient Name: Connie Mike   YOB: 1985  Referring Primary Care Physician: Kevin Lindsay MD  BMI: Body mass index is 21.11 kg/m².    Chief Complaint:    Chief Complaint   Patient presents with   • Right Wrist - Pain        HPI: New pt here that is RHD and works as consultant and works out at [employer] and has right wrist pain with flexion turning and doing exercises like planks and exercises that require full body weight placed on her hands.  Patient has used over-the-counter NSAIDs temporarily and restricted activity wrist pain improves momentarily and then returned she has not tried splinting injections or any braces.  Primary care physician ordered an MRI plain films were done by them with interpretation was read as no fracture.  No recall of acute fall or acute injury she did a lot of intense workouts to prepare for a competition and contributes some of it to overuse.  Connie Mike is a 37 y.o. female who presents today for evaluation of   Chief Complaint   Patient presents with   • Right Wrist - Pain         Subjective   Medications:   Home Medications:  Current Outpatient Medications on File Prior to Visit   Medication Sig   • cetirizine (zyrTEC) 10 MG tablet Take 1 tablet by mouth Daily.   • clobetasol (TEMOVATE) 0.05 % ointment    • mupirocin (BACTROBAN) 2 % ointment APPLY TO SKIN SURROUNDING AFFECTED FINGERNAIL ONE TO TWO TIMES DAILY UNTIL RESOLVED   • pantoprazole (PROTONIX) 40 MG EC tablet TAKE 1 TABLET BY MOUTH DAILY   • sulfamethoxazole-trimethoprim (Bactrim DS) 800-160 MG per tablet Take 1 tablet by mouth 2 (Two) Times a Day.   • TRI-LO-SPRINTEC 0.18/0.215/0.25 MG-25 MCG per tablet Take 1 tablet by mouth Daily.   • ciprofloxacin (CILOXAN) 0.3 % ophthalmic solution APPLY 2 DROPS TO AFFECTED NAIL DAILY UNTIL DISCOLORATION HAS GROWN OUT (Patient not taking: Reported on 4/17/2023)     No current facility-administered medications on file prior to visit.     Current  Medications:  Scheduled Meds:  Continuous Infusions:No current facility-administered medications for this visit.    PRN Meds:.    I have reviewed the patient's medical history in detail and updated the computerized patient record.  Review and summarization of old records includes:    Past Medical History:   Diagnosis Date   • Abdominal pain, acute, left lower quadrant    • Acute pharyngitis    • Amenorrhea    • Anxiety    • Arthralgia of temporomandibular joint    • Cystitis, acute    • Cystitis, chronic    • Encounter for preconception consultation    • Encounter for preventive health examination    • Non-smoker     Never a smoker   • Temporal pain    • Tennis elbow    • Tingling    • UTI symptoms    • Visit for gynecologic examination         Past Surgical History:   Procedure Laterality Date   • ADENOIDECTOMY     • EYE SURGERY     • TONSILLECTOMY     • TONSILLECTOMY          Social History     Occupational History   • Not on file   Tobacco Use   • Smoking status: Former     Packs/day: 0.25     Years: 5.00     Pack years: 1.25     Types: Cigarettes     Quit date: 2017     Years since quittin.3   • Smokeless tobacco: Never   • Tobacco comments:     quit in 2017   Substance and Sexual Activity   • Alcohol use: Yes     Alcohol/week: 5.0 standard drinks     Types: 5 Cans of beer per week     Comment: Minimal consumption   • Drug use: Never   • Sexual activity: Yes     Partners: Male     Birth control/protection: Pill      Social History     Social History Narrative   • Not on file        Family History   Problem Relation Age of Onset   • Alcohol abuse Father    • Hypertension Father    • Diabetes type II Father    • Heart disease Father    • Breast cancer Paternal Grandmother    • No Known Problems Other        ROS: 14 point review of systems was performed and all other systems were reviewed and are negative except for documented findings in HPI and today's encounter.     Allergies: No Known  "Allergies  Constitutional:  Denies fever, shaking or chills   Eyes:  Denies change in visual acuity   HENT:  Denies nasal congestion or sore throat   Respiratory:  Denies cough or shortness of breath   Cardiovascular:  Denies chest pain or severe LE edema   GI:  Denies abdominal pain, nausea, vomiting, bloody stools or diarrhea   Musculoskeletal:  Numbness, tingling, pain, or loss of motor function only as noted above in history of present illness.  : Denies painful urination or hematuria  Integument:  Denies rash, lesion or ulceration   Neurologic:  Denies headache or focal weakness  Endocrine:  Denies lymphadenopathy  Psych:  Denies confusion or change in mental status   Hem:  Denies active bleeding    OBJECTIVE:  Physical Exam: 37 y.o. female  Wt Readings from Last 3 Encounters:   04/17/23 55.8 kg (123 lb)   04/11/23 56.7 kg (125 lb)   03/14/23 56.2 kg (124 lb)     Ht Readings from Last 1 Encounters:   04/17/23 162.6 cm (64\")     Body mass index is 21.11 kg/m².  Vitals:    04/17/23 0952   Temp: 98.4 °F (36.9 °C)     Vital signs reviewed.     General Appearance:    Alert, cooperative, in no acute distress                  Eyes: conjunctiva clear  ENT: external ears and nose atraumatic  CV: no peripheral edema  Resp: normal respiratory effort  Skin: no rashes or wounds; normal turgor  Psych: mood and affect appropriate  Lymph: no nodes appreciated  Neuro: gross sensation intact  Vascular:  Palpable peripheral pulse in noted extremity  Musculoskeletal Extremities: Anatomic snuffbox is nontender to palpation she is tender over the TFCC with movement on the ulnar side of the wrist and ulnar styloid patient has pain with forced forearm pronation and supination neurovascular intact good cap refill elbows nontender forearms nontender skin is benign obvious deformity  Radiology:   Done with PCP and was read as normal and they ordered a mri     Assessment:     ICD-10-CM ICD-9-CM   1. Right wrist pain  M25.531 719.43   2. " Pain of ulnar side of wrist  M25.539 719.43        Procedures    Concern for TFCC injury MRI was ordered by primary care physician and will immobilize in a cock up splint advised to refrain from full weightbearing on the wrist and rest it and see if it improves and follow-up with hand surgery   MDM/Plan:   The diagnosis(es), natural history, pathophysiology and treatment for diagnosis(es) were discussed. Opportunity given and questions answered.  Biomechanics of pertinent body areas discussed.  When appropriate, the use of ambulatory aids discussed.  MEDICATIONS:  The risks, benefits, warnings,side effects and alternatives of medications discussed.  Inflammation/pain control; with cold, heat, elevation and/or liniments discussed as appropriate  SPECIALTY REFERRAL  MEDICAL RECORDS reviewed from other provider(s) for past and current medical history pertinent to this complaint.      4/17/2023    Much of this encounter note is an electronic transcription/translation of spoken language to printed text. The electronic translation of spoken language may permit erroneous, or at times, nonsensical words or phrases to be inadvertently transcribed; Although I have reviewed the note for such errors, some may still exist

## 2023-04-19 ENCOUNTER — TRANSCRIBE ORDERS (OUTPATIENT)
Dept: FAMILY MEDICINE CLINIC | Facility: CLINIC | Age: 38
End: 2023-04-19
Payer: COMMERCIAL

## 2023-04-19 ENCOUNTER — PATIENT ROUNDING (BHMG ONLY) (OUTPATIENT)
Dept: ORTHOPEDIC SURGERY | Facility: CLINIC | Age: 38
End: 2023-04-19
Payer: COMMERCIAL

## 2023-04-19 NOTE — PROGRESS NOTES
A StyleChat by [employer] Mobile Message has been sent to the patient for PATIENT ROUNDING with [facility] [place]

## 2023-04-24 DIAGNOSIS — M25.531 RIGHT WRIST PAIN: Primary | ICD-10-CM

## 2023-04-27 NOTE — PROGRESS NOTES
Answers for HPI/ROS submitted by the patient on 3/17/2023  Please describe your symptoms.: Wrist is sore and need an xray  Have you had these symptoms before?: No  How long have you been having these symptoms?: Greater than 2 weeks  Please list any medications you are currently taking for this condition.: ibuprofen  Please describe any probable cause for these symptoms. : A fall  What is the primary reason for your visit?: Other      This encounter was created in error - please disregard.

## 2023-05-01 ENCOUNTER — TRANSCRIBE ORDERS (OUTPATIENT)
Dept: ADMINISTRATIVE | Facility: HOSPITAL | Age: 38
End: 2023-05-01
Payer: COMMERCIAL

## 2023-05-02 DIAGNOSIS — M25.531 RIGHT WRIST PAIN: Primary | ICD-10-CM

## 2023-09-13 ENCOUNTER — TELEPHONE (OUTPATIENT)
Dept: FAMILY MEDICINE CLINIC | Facility: CLINIC | Age: 38
End: 2023-09-13
Payer: COMMERCIAL

## 2023-09-13 NOTE — TELEPHONE ENCOUNTER
Patient has had 2 episodes of racing heart, hard to catch her breath with 1 of the episodes waking her up during the night with pain in the right shoulder blade area & tingling in the right hand, it continued through out the day, this was labor day. Patient had 2nd episode a day or 2 ago, racing heart, had to catch her breath, this episode happened shortly after working out.     Advised to ED & made patient appointment on 9/19/23 with Dr Lindsay

## 2023-09-19 ENCOUNTER — OFFICE VISIT (OUTPATIENT)
Dept: FAMILY MEDICINE CLINIC | Facility: CLINIC | Age: 38
End: 2023-09-19
Payer: COMMERCIAL

## 2023-09-19 VITALS
SYSTOLIC BLOOD PRESSURE: 116 MMHG | TEMPERATURE: 98.9 F | DIASTOLIC BLOOD PRESSURE: 68 MMHG | HEART RATE: 54 BPM | WEIGHT: 129.2 LBS | OXYGEN SATURATION: 96 % | HEIGHT: 64 IN | BODY MASS INDEX: 22.06 KG/M2

## 2023-09-19 DIAGNOSIS — Z13.6 ENCOUNTER FOR SCREENING FOR CARDIOVASCULAR DISORDERS: ICD-10-CM

## 2023-09-19 DIAGNOSIS — K21.9 GERD WITHOUT ESOPHAGITIS: ICD-10-CM

## 2023-09-19 DIAGNOSIS — R00.1 BRADYCARDIA: ICD-10-CM

## 2023-09-19 DIAGNOSIS — R06.02 SHORTNESS OF BREATH: ICD-10-CM

## 2023-09-19 DIAGNOSIS — Z00.00 ENCOUNTER FOR ANNUAL HEALTH EXAMINATION: Primary | ICD-10-CM

## 2023-09-19 RX ORDER — FAMOTIDINE 10 MG
10 TABLET ORAL NIGHTLY PRN
Start: 2023-09-19

## 2023-09-19 RX ORDER — ETHINYL ESTRADIOL/DROSPIRENONE 0.02-3(28)
TABLET ORAL
COMMUNITY
Start: 2023-06-19

## 2023-09-19 NOTE — PROGRESS NOTES
Patient here for annual physical exam    Subjective   Connie Mike is a 37 y.o. female.     Shortness of Breath  Pertinent negatives include no fever or rhinorrhea.    37 year old WF here for annual.    The following portions of the patient's history were reviewed and updated as appropriate: allergies, current medications, past family history, past medical history, past social history, past surgical history and problem list    Last colonoscopy:na  Dentist: utd  Optometry:utd  Last PSA(if applicable):na  PAP utd  Last mammo(if applicable):na    C/o episodic heartburn almost daily at times with tomato foods.    C/o Shortness of breath while laying in bed.  Lasted for a few hours.  Occurred Labor day.  Had pain in R back shoulder blade as well.  Occurred again 2 weeks ago.  Lasted a few hours as well.  Some chest tightness as well.      Immunization History   Administered Date(s) Administered    COVID-19 (PFIZER) Purple Cap Monovalent 03/26/2021, 04/15/2021, 11/16/2021    Flu Vaccine Intradermal Quad 18-64YR 12/05/2014    Flu Vaccine Split Quad 10/22/2021    Flublok 18+yrs 09/29/2020    Fluzone (or Fluarix & Flulaval for VFC) >6mos 10/03/2019, 10/22/2021    Hepatitis A 10/03/2019, 07/15/2020    Influenza Injectable Mdck Pf Quad 09/27/2022    Influenza, Unspecified 09/27/2022    Tdap 12/05/2014, 06/13/2019       Review of Systems   Constitutional:  Negative for fatigue and fever.   HENT:  Negative for postnasal drip and rhinorrhea.    Respiratory:  Positive for shortness of breath.      Patient Active Problem List   Diagnosis    UTI symptoms    Tingling    Temporal pain    Non-smoker    Cystitis, chronic    Cystitis, acute    Arthralgia of temporomandibular joint    Anxiety    Amenorrhea    Acute pharyngitis    Abdominal pain, acute, left lower quadrant    Encounter for annual health examination    Seasonal allergic rhinitis due to pollen    GERD without esophagitis    Encounter for screening for cardiovascular  disorders    Immunization deficiency    Chronic idiopathic constipation    Bloating    Abnormal sense of taste    Allergic reaction    Other specified anemias    Polyarthralgia    Lateral epicondylitis of both elbows    Shortness of breath    Bradycardia       No Known Allergies      Current Outpatient Medications:     pantoprazole (PROTONIX) 40 MG EC tablet, TAKE 1 TABLET BY MOUTH DAILY, Disp: 90 tablet, Rfl: 3    LESA 3-0.02 MG per tablet, , Disp: , Rfl:     famotidine (Pepcid AC) 10 MG tablet, Take 1 tablet by mouth At Night As Needed for Heartburn., Disp: , Rfl:     Past Medical History:   Diagnosis Date    Abdominal pain, acute, left lower quadrant     Acute pharyngitis     Amenorrhea     Anxiety     Arthralgia of temporomandibular joint     Cystitis, acute     Cystitis, chronic     Encounter for preconception consultation     Encounter for preventive health examination     Non-smoker     Never a smoker    Temporal pain     Tennis elbow     Tingling     UTI symptoms     Visit for gynecologic examination        Past Surgical History:   Procedure Laterality Date    ADENOIDECTOMY      EYE SURGERY      TONSILLECTOMY      TONSILLECTOMY         Family History   Problem Relation Age of Onset    Alcohol abuse Father     Hypertension Father     Diabetes type II Father     Heart disease Father     Breast cancer Paternal Grandmother     No Known Problems Other        Social History     Tobacco Use    Smoking status: Former     Packs/day: 0.25     Years: 5.00     Pack years: 1.25     Types: Cigarettes     Quit date: 2017     Years since quittin.8    Smokeless tobacco: Never    Tobacco comments:     quit in 2017   Substance Use Topics    Alcohol use: Yes     Alcohol/week: 5.0 standard drinks     Types: 5 Cans of beer per week     Comment: Minimal consumption            Objective     Vitals:    23 0805   BP:    Pulse: 54   Temp:    SpO2:      Body mass index is 22.17 kg/m².    Physical Exam  Vitals reviewed.    Constitutional:       Appearance: She is well-developed. She is not diaphoretic.   HENT:      Head: Normocephalic and atraumatic.   Eyes:      General: No scleral icterus.     Pupils: Pupils are equal, round, and reactive to light.   Neck:      Thyroid: No thyromegaly.   Cardiovascular:      Rate and Rhythm: Normal rate and regular rhythm.      Heart sounds: No murmur heard.    No friction rub. No gallop.   Pulmonary:      Effort: Pulmonary effort is normal. No respiratory distress.      Breath sounds: No wheezing or rales.   Chest:      Chest wall: No tenderness.   Abdominal:      General: Bowel sounds are normal. There is no distension.      Palpations: Abdomen is soft.      Tenderness: There is no abdominal tenderness.   Musculoskeletal:         General: No deformity. Normal range of motion.   Lymphadenopathy:      Cervical: No cervical adenopathy.   Skin:     General: Skin is warm and dry.      Findings: No rash.   Neurological:      Cranial Nerves: No cranial nerve deficit.      Motor: No abnormal muscle tone.       Lab Results   Component Value Date    GLUCOSE 95 12/08/2022    BUN 21 (H) 12/08/2022    CREATININE 0.86 12/08/2022    EGFRIFNONA 78 01/30/2020    EGFRIFAFRI 94 01/30/2020    BCR 24.4 12/08/2022    K 4.3 12/08/2022    CO2 27.0 12/08/2022    CALCIUM 9.5 12/08/2022    PROTENTOTREF 6.7 12/08/2022    ALBUMIN 4.60 12/08/2022    LABIL2 2.2 12/08/2022    AST 29 12/08/2022    ALT 11 12/08/2022       WBC   Date Value Ref Range Status   12/08/2022 4.72 3.40 - 10.80 10*3/mm3 Final   09/05/2019 7.01 4.5 - 11.0 10*3/uL Final     RBC   Date Value Ref Range Status   12/08/2022 3.84 3.77 - 5.28 10*6/mm3 Final   09/05/2019 3.88 (L) 4.0 - 5.2 10*6/uL Final     Hemoglobin   Date Value Ref Range Status   12/08/2022 13.2 12.0 - 15.9 g/dL Final   09/06/2019 11.2 (L) 12.0 - 16.0 g/dL Final     Hematocrit   Date Value Ref Range Status   12/08/2022 37.1 34.0 - 46.6 % Final   09/06/2019 34.3 (L) 36.0 - 46.0 % Final     MCV    Date Value Ref Range Status   12/08/2022 96.6 79.0 - 97.0 fL Final   09/05/2019 94.3 80.0 - 100.0 fL Final     MCH   Date Value Ref Range Status   12/08/2022 34.4 (H) 26.6 - 33.0 pg Final   09/05/2019 30.9 26.0 - 34.0 pg Final     MCHC   Date Value Ref Range Status   12/08/2022 35.6 31.5 - 35.7 g/dL Final   09/05/2019 32.8 31.0 - 37.0 g/dL Final     RDW   Date Value Ref Range Status   12/08/2022 11.7 (L) 12.3 - 15.4 % Final   09/05/2019 13.9 12.0 - 16.8 % Final     MPV   Date Value Ref Range Status   09/05/2019 9.9 6.7 - 10.8 fL Final     Platelets   Date Value Ref Range Status   12/08/2022 186 140 - 450 10*3/mm3 Final   09/05/2019 168 140 - 440 10*3/uL Final     Neutrophil Rel %   Date Value Ref Range Status   12/08/2022 65.3 42.7 - 76.0 % Final   09/05/2019 70.6 45 - 80 % Final     Lymphocyte Rel %   Date Value Ref Range Status   12/08/2022 19.1 (L) 19.6 - 45.3 % Final   09/05/2019 19.8 15 - 50 % Final     Monocyte Rel %   Date Value Ref Range Status   12/08/2022 12.9 (H) 5.0 - 12.0 % Final   09/05/2019 8.0 0 - 15 % Final     Eosinophil Rel %   Date Value Ref Range Status   12/08/2022 1.9 0.3 - 6.2 % Final     Eosinophil %   Date Value Ref Range Status   09/05/2019 1.1 0 - 7 % Final     Basophil Rel %   Date Value Ref Range Status   12/08/2022 0.6 0.0 - 1.5 % Final   09/05/2019 0.1 0 - 2 % Final     Immature Grans %   Date Value Ref Range Status   09/05/2019 0.4 (H) 0 % Final     Neutrophils Absolute   Date Value Ref Range Status   12/08/2022 3.08 1.70 - 7.00 10*3/mm3 Final   09/05/2019 4.94 2.0 - 8.8 10*3/uL Final     Lymphocytes Absolute   Date Value Ref Range Status   12/08/2022 0.90 0.70 - 3.10 10*3/mm3 Final   09/05/2019 1.39 0.7 - 5.5 10*3/uL Final     Monocytes Absolute   Date Value Ref Range Status   12/08/2022 0.61 0.10 - 0.90 10*3/mm3 Final   09/05/2019 0.56 0.0 - 1.7 10*3/uL Final     Eosinophils Absolute   Date Value Ref Range Status   12/08/2022 0.09 0.00 - 0.40 10*3/mm3 Final   09/05/2019 0.08 0.0  - 0.8 10*3/uL Final     Basophils Absolute   Date Value Ref Range Status   12/08/2022 0.03 0.00 - 0.20 10*3/mm3 Final   09/05/2019 0.01 0.0 - 0.2 10*3/uL Final     Immature Grans, Absolute   Date Value Ref Range Status   09/05/2019 0.03 <1 10*3/uL Final     nRBC   Date Value Ref Range Status   12/08/2022 0.0 0.0 - 0.2 /100 WBC Final   09/05/2019 0 0 /100(WBC) Final       No results found for: HGBA1C    Lab Results   Component Value Date    HKDJZQJQ55 383 03/24/2022       TSH   Date Value Ref Range Status   12/08/2022 2.580 0.270 - 4.200 uIU/mL Final       No results found for: CHOL  Lab Results   Component Value Date    TRIG 80 03/24/2022     Lab Results   Component Value Date    HDL 80 03/24/2022     Lab Results   Component Value Date    LDL 80 03/24/2022     Lab Results   Component Value Date    VLDL 15 03/24/2022     No results found for: LDLHDL      Procedures  EKG obtained due to SOA, no comparisons.   Findings of sinus giovanny in mid 40s but otherwise normal.      Assessment & Plan   Problems Addressed this Visit       Bradycardia    Relevant Orders    Ambulatory Referral to Cardiology    Encounter for annual health examination - Primary    Encounter for screening for cardiovascular disorders    Relevant Orders    Lipid Panel With / Chol / HDL Ratio    GERD without esophagitis    Relevant Medications    famotidine (Pepcid AC) 10 MG tablet    Shortness of breath    Relevant Orders    CBC & Differential    Comprehensive Metabolic Panel    CT Chest With Contrast Diagnostic    ECG 12 Lead    Ambulatory Referral to Cardiology     Diagnoses         Codes Comments    Encounter for annual health examination    -  Primary ICD-10-CM: Z00.00  ICD-9-CM: V70.0     Shortness of breath     ICD-10-CM: R06.02  ICD-9-CM: 786.05     GERD without esophagitis     ICD-10-CM: K21.9  ICD-9-CM: 530.81     Encounter for screening for cardiovascular disorders     ICD-10-CM: Z13.6  ICD-9-CM: V81.2     Bradycardia     ICD-10-CM:  R00.1  ICD-9-CM: 427.89             Shortness of breath with pleuritic chest pain.  Check CT with contrast of chest.    Check CBC, CMP.    DIANN.  Uncontrolled. Continue pantoprazole and start pepcid ac at night.  Bradycardia.  EKG findings as above.  Sinus in nature, but get cards input as having episodes of SOA.      Preventive Counseling:  Encouraged to stay active.  Covid vaccine UTD.  Flu recommended. HEP A UTD. Check FLP.   Dentist UTD.  Optho UTD.      Orders Placed This Encounter   Procedures    CT Chest With Contrast Diagnostic     Standing Status:   Future     Standing Expiration Date:   9/19/2024     Order Specific Question:   Patient Pregnant     Answer:   No     Order Specific Question:   Release to patient     Answer:   Routine Release [[id number]]    Comprehensive Metabolic Panel     Order Specific Question:   Release to patient     Answer:   Routine Release [[id number]]    Lipid Panel With / Chol / HDL Ratio     Order Specific Question:   Release to patient     Answer:   Routine Release [[id number]]    Ambulatory Referral to Cardiology     Referral Priority:   Routine     Referral Type:   Consultation     Referral Reason:   Specialty Services Required     Referred to Provider:   Jn Regan III, MD     Requested Specialty:   Cardiology     Number of Visits Requested:   1    ECG 12 Lead     Order Specific Question:   Reason for Exam:     Answer:   dyspnea     Order Specific Question:   Release to patient     Answer:   Routine Release [[id number]]    CBC & Differential     Order Specific Question:   Manual Differential     Answer:   No     Order Specific Question:   Release to patient     Answer:   Routine Release [[id number]]       Current Outpatient Medications   Medication Sig Dispense Refill    pantoprazole (PROTONIX) 40 MG EC tablet TAKE 1 TABLET BY MOUTH DAILY 90 tablet 3    LESA 3-0.02 MG per tablet       famotidine (Pepcid AC) 10 MG tablet Take 1 tablet by mouth At Night As Needed for  Heartburn.       No current facility-administered medications for this visit.       Return in about 4 months (around 1/19/2024).    There are no Patient Instructions on file for this visit.

## 2023-09-20 ENCOUNTER — TELEPHONE (OUTPATIENT)
Dept: FAMILY MEDICINE CLINIC | Facility: CLINIC | Age: 38
End: 2023-09-20

## 2023-09-20 LAB
ALBUMIN SERPL-MCNC: 4.8 G/DL (ref 3.5–5.2)
ALBUMIN/GLOB SERPL: 2.1 G/DL
ALP SERPL-CCNC: 41 U/L (ref 39–117)
ALT SERPL-CCNC: 13 U/L (ref 1–33)
AST SERPL-CCNC: 26 U/L (ref 1–32)
BASOPHILS # BLD AUTO: 0.04 10*3/MM3 (ref 0–0.2)
BASOPHILS NFR BLD AUTO: 0.9 % (ref 0–1.5)
BILIRUB SERPL-MCNC: 0.3 MG/DL (ref 0–1.2)
BUN SERPL-MCNC: 18 MG/DL (ref 6–20)
BUN/CREAT SERPL: 18.2 (ref 7–25)
CALCIUM SERPL-MCNC: 9.6 MG/DL (ref 8.6–10.5)
CHLORIDE SERPL-SCNC: 104 MMOL/L (ref 98–107)
CHOLEST SERPL-MCNC: 195 MG/DL (ref 0–200)
CHOLEST/HDLC SERPL: 2.19 {RATIO}
CO2 SERPL-SCNC: 26.2 MMOL/L (ref 22–29)
CREAT SERPL-MCNC: 0.99 MG/DL (ref 0.57–1)
EGFRCR SERPLBLD CKD-EPI 2021: 75.5 ML/MIN/1.73
EOSINOPHIL # BLD AUTO: 0.15 10*3/MM3 (ref 0–0.4)
EOSINOPHIL NFR BLD AUTO: 3.3 % (ref 0.3–6.2)
ERYTHROCYTE [DISTWIDTH] IN BLOOD BY AUTOMATED COUNT: 11.4 % (ref 12.3–15.4)
GLOBULIN SER CALC-MCNC: 2.3 GM/DL
GLUCOSE SERPL-MCNC: 89 MG/DL (ref 65–99)
HCT VFR BLD AUTO: 39.3 % (ref 34–46.6)
HDLC SERPL-MCNC: 89 MG/DL (ref 40–60)
HGB BLD-MCNC: 13.6 G/DL (ref 12–15.9)
IMM GRANULOCYTES # BLD AUTO: 0.01 10*3/MM3 (ref 0–0.05)
IMM GRANULOCYTES NFR BLD AUTO: 0.2 % (ref 0–0.5)
LDLC SERPL CALC-MCNC: 93 MG/DL (ref 0–100)
LYMPHOCYTES # BLD AUTO: 1.85 10*3/MM3 (ref 0.7–3.1)
LYMPHOCYTES NFR BLD AUTO: 40.8 % (ref 19.6–45.3)
MCH RBC QN AUTO: 33.2 PG (ref 26.6–33)
MCHC RBC AUTO-ENTMCNC: 34.6 G/DL (ref 31.5–35.7)
MCV RBC AUTO: 95.9 FL (ref 79–97)
MONOCYTES # BLD AUTO: 0.32 10*3/MM3 (ref 0.1–0.9)
MONOCYTES NFR BLD AUTO: 7.1 % (ref 5–12)
NEUTROPHILS # BLD AUTO: 2.16 10*3/MM3 (ref 1.7–7)
NEUTROPHILS NFR BLD AUTO: 47.7 % (ref 42.7–76)
NRBC BLD AUTO-RTO: 0 /100 WBC (ref 0–0.2)
PLATELET # BLD AUTO: 214 10*3/MM3 (ref 140–450)
POTASSIUM SERPL-SCNC: 5 MMOL/L (ref 3.5–5.2)
PROT SERPL-MCNC: 7.1 G/DL (ref 6–8.5)
RBC # BLD AUTO: 4.1 10*6/MM3 (ref 3.77–5.28)
SODIUM SERPL-SCNC: 138 MMOL/L (ref 136–145)
TRIGL SERPL-MCNC: 71 MG/DL (ref 0–150)
VLDLC SERPL CALC-MCNC: 13 MG/DL (ref 5–40)
WBC # BLD AUTO: 4.53 10*3/MM3 (ref 3.4–10.8)

## 2023-09-20 NOTE — TELEPHONE ENCOUNTER
Relationship: Other    Best call back number: [phone]    What orders are you requesting (i.e. lab or imaging): CT OF CHEST     In what timeframe would the patient need to come in: N/A     Where will you receive your lab/imaging services: N/A     Additional notes: PATIENT WOULD LIKE ORDER FAX TO HAVE THIS DONE AT PLEASE FAX OVER CT OF CHEST TO [facility]- [phone]

## 2023-10-05 DIAGNOSIS — R06.02 SHORTNESS OF BREATH: ICD-10-CM

## 2023-10-10 ENCOUNTER — OFFICE VISIT (OUTPATIENT)
Dept: CARDIOLOGY | Facility: CLINIC | Age: 38
End: 2023-10-10
Payer: COMMERCIAL

## 2023-10-10 VITALS
WEIGHT: 132 LBS | HEART RATE: 57 BPM | BODY MASS INDEX: 22.53 KG/M2 | SYSTOLIC BLOOD PRESSURE: 112 MMHG | HEIGHT: 64 IN | DIASTOLIC BLOOD PRESSURE: 71 MMHG

## 2023-10-10 DIAGNOSIS — R00.2 PALPITATIONS: ICD-10-CM

## 2023-10-10 DIAGNOSIS — R06.02 SOB (SHORTNESS OF BREATH): Primary | ICD-10-CM

## 2023-10-10 PROCEDURE — 99203 OFFICE O/P NEW LOW 30 MIN: CPT | Performed by: INTERNAL MEDICINE

## 2023-10-10 NOTE — PROGRESS NOTES
Subjective:     Encounter Date:10/10/23      Patient ID: Connie Mike is a 37 y.o. female.    Chief Complaint:  History of Present Illness    Dear Dr. Lindsay,    I had the pleasure of seeing this patient in the office today for initial evaluation and consultation.  I appreciate that you sent her in to see us.  They come in today to be seen for evaluation of episodes of dyspnea.    Patient has had several episodes where suddenly became short of breath.  Each time she just been sitting down.  She said it would be pretty prolonged, last for hours, and that she did feel really fatigued for the rest of the day and into the next day.  When she thought about coming to the emergency room thought she just was not quite sick enough to have to do that.    She has never had any cardiac issues before.  She is never required any cardiovascular evaluation before.  This patient has no known cardiac history.  This patient has no history of coronary artery disease, congestive heart failure, rheumatic fever, rheumatic heart disease, congenital heart disease or heart murmur.  This patient has never required invasive cardiovascular evaluation.    She denies any chest pain when she has the episodes.  She just noticed the shortness of breath, but also feels that her heart is beating faster.  Has not noticed any irregularity.  No associated nausea vomit diaphoresis.  She is very physically active and is able to exercise without any symptoms.  She has not had any problems lower extremity edema.  No orthopnea or PND.  You performed a chest CT that was unremarkable.  No coronary artery calcification was noted.    When she was in your office she was noted to have a sinus bradycardia although she has had this noted before.    The following portions of the patient's history were reviewed and updated as appropriate: allergies, current medications, past family history, past medical history, past social history, past surgical history and  "problem list.    Procedures       Objective:     Vitals:    10/10/23 0805   BP: 112/71   Pulse: 57   Weight: 59.9 kg (132 lb)   Height: 162.6 cm (64\")     Body mass index is 22.66 kg/mý.      Vitals reviewed.   Constitutional:       General: Not in acute distress.     Appearance: Well-developed. Not diaphoretic.   Eyes:      General:         Right eye: No discharge.         Left eye: No discharge.      Conjunctiva/sclera: Conjunctivae normal.   HENT:      Head: Normocephalic and atraumatic.      Nose: Nose normal.   Neck:      Thyroid: No thyromegaly.      Trachea: No tracheal deviation.   Pulmonary:      Effort: Pulmonary effort is normal. No respiratory distress.      Breath sounds: Normal breath sounds. No stridor.   Chest:      Chest wall: Not tender to palpatation.   Cardiovascular:      Normal rate. Regular rhythm.      Murmurs: There is no murmur.      . No S3 gallop. No click. No rub.   Pulses:     Intact distal pulses.   Edema:     Peripheral edema absent.   Abdominal:      General: Bowel sounds are normal. There is no distension.      Palpations: Abdomen is soft. There is no abdominal mass.   Musculoskeletal: Normal range of motion.         General: No tenderness or deformity.      Cervical back: Normal range of motion and neck supple. Skin:     General: Skin is warm and dry.      Findings: No erythema or rash.   Neurological:      Mental Status: Alert.   Psychiatric:         Attention and Perception: Attention normal.         Data and records reviewed:     Lab Results   Component Value Date    GLUCOSE 89 09/19/2023    BUN 18 09/19/2023    CREATININE 0.99 09/19/2023    EGFRRESULT 75.5 09/19/2023    BCR 18.2 09/19/2023    K 5.0 09/19/2023    CO2 26.2 09/19/2023    CALCIUM 9.6 09/19/2023    PROTENTOTREF 7.1 09/19/2023    ALBUMIN 4.8 09/19/2023    BILITOT 0.3 09/19/2023    AST 26 09/19/2023    ALT 13 09/19/2023     No results found for: \"CHOL\"  Lab Results   Component Value Date    TRIG 71 09/19/2023    TRIG " "80 03/24/2022    TRIG 80 01/30/2020     Lab Results   Component Value Date    HDL 89 (H) 09/19/2023    HDL 80 03/24/2022    HDL 56 01/30/2020     Lab Results   Component Value Date    LDL 93 09/19/2023    LDL 80 03/24/2022    LDL 63 01/30/2020     Lab Results   Component Value Date    VLDL 13 09/19/2023    VLDL 15 03/24/2022    VLDL 16 01/30/2020     No results found for: \"LDLHDL\"  CBC          12/8/2022    14:02 9/19/2023    08:38   CBC   WBC 4.72  4.53    RBC 3.84  4.10    Hemoglobin 13.2  13.6    Hematocrit 37.1  39.3    MCV 96.6  95.9    MCH 34.4  33.2    MCHC 35.6  34.6    RDW 11.7  11.4    Platelets 186  214      No radiology results for the last 90 days.          Assessment:          Diagnosis Plan   1. SOB (shortness of breath)  Adult Transthoracic Echo Complete W/ Cont if Necessary Per Protocol      2. Palpitations               Plan:       1.  Episodes of shortness of breath with some palpitations and sensation tachycardia-we will arrange for an echocardiogram performed.  Also suggest that she  a cardia monitor and use it at times where she feels that her heart rate is increased in an unexpected fashion.    Thank you very much for allowing us to participate in the care of this pleasant patient.  Please don't hesitate to call if I can be of assistance in any way.      Current Outpatient Medications:     famotidine (Pepcid AC) 10 MG tablet, Take 1 tablet by mouth At Night As Needed for Heartburn., Disp: , Rfl:     pantoprazole (PROTONIX) 40 MG EC tablet, TAKE 1 TABLET BY MOUTH DAILY, Disp: 90 tablet, Rfl: 3    LESA 3-0.02 MG per tablet, , Disp: , Rfl:          No follow-ups on file.    "

## 2023-10-16 ENCOUNTER — TELEPHONE (OUTPATIENT)
Dept: CARDIOLOGY | Facility: CLINIC | Age: 38
End: 2023-10-16
Payer: COMMERCIAL

## 2023-10-16 ENCOUNTER — HOSPITAL ENCOUNTER (OUTPATIENT)
Dept: CARDIOLOGY | Facility: HOSPITAL | Age: 38
Discharge: HOME | End: 2023-10-16
Admitting: INTERNAL MEDICINE
Payer: COMMERCIAL

## 2023-10-16 VITALS
BODY MASS INDEX: 22.55 KG/M2 | HEIGHT: 64 IN | SYSTOLIC BLOOD PRESSURE: 90 MMHG | WEIGHT: 132.06 LBS | HEART RATE: 49 BPM | DIASTOLIC BLOOD PRESSURE: 60 MMHG

## 2023-10-16 DIAGNOSIS — R06.02 SOB (SHORTNESS OF BREATH): ICD-10-CM

## 2023-10-16 LAB
AORTIC ARCH: 2.2 CM
ASCENDING AORTA: 2.1 CM
BH CV ECHO MEAS - ACS: 2.08 CM
BH CV ECHO MEAS - AO MAX PG: 6.4 MMHG
BH CV ECHO MEAS - AO MEAN PG: 3.7 MMHG
BH CV ECHO MEAS - AO ROOT DIAM: 2.6 CM
BH CV ECHO MEAS - AO V2 MAX: 126.8 CM/SEC
BH CV ECHO MEAS - AO V2 VTI: 33.4 CM
BH CV ECHO MEAS - AVA(I,D): 2.04 CM2
BH CV ECHO MEAS - EDV(CUBED): 68.2 ML
BH CV ECHO MEAS - EDV(MOD-SP2): 72 ML
BH CV ECHO MEAS - EDV(MOD-SP4): 67 ML
BH CV ECHO MEAS - EF(MOD-BP): 68.6 %
BH CV ECHO MEAS - EF(MOD-SP2): 63.9 %
BH CV ECHO MEAS - EF(MOD-SP4): 71.6 %
BH CV ECHO MEAS - ESV(CUBED): 16.7 ML
BH CV ECHO MEAS - ESV(MOD-SP2): 26 ML
BH CV ECHO MEAS - ESV(MOD-SP4): 19 ML
BH CV ECHO MEAS - FS: 37.5 %
BH CV ECHO MEAS - IVS/LVPW: 1.07 CM
BH CV ECHO MEAS - IVSD: 0.93 CM
BH CV ECHO MEAS - LAT PEAK E' VEL: 16.3 CM/SEC
BH CV ECHO MEAS - LV DIASTOLIC VOL/BSA (35-75): 40.9 CM2
BH CV ECHO MEAS - LV MASS(C)D: 113.8 GRAMS
BH CV ECHO MEAS - LV MAX PG: 5.4 MMHG
BH CV ECHO MEAS - LV MEAN PG: 2.5 MMHG
BH CV ECHO MEAS - LV SYSTOLIC VOL/BSA (12-30): 11.6 CM2
BH CV ECHO MEAS - LV V1 MAX: 116.5 CM/SEC
BH CV ECHO MEAS - LV V1 VTI: 25 CM
BH CV ECHO MEAS - LVIDD: 4.1 CM
BH CV ECHO MEAS - LVIDS: 2.6 CM
BH CV ECHO MEAS - LVOT AREA: 2.7 CM2
BH CV ECHO MEAS - LVOT DIAM: 1.86 CM
BH CV ECHO MEAS - LVPWD: 0.87 CM
BH CV ECHO MEAS - MED PEAK E' VEL: 12.6 CM/SEC
BH CV ECHO MEAS - MR MAX PG: 42.1 MMHG
BH CV ECHO MEAS - MR MAX VEL: 324.3 CM/SEC
BH CV ECHO MEAS - MV A DUR: 0.15 SEC
BH CV ECHO MEAS - MV A MAX VEL: 33.4 CM/SEC
BH CV ECHO MEAS - MV DEC SLOPE: 344.6 CM/SEC2
BH CV ECHO MEAS - MV DEC TIME: 0.23 SEC
BH CV ECHO MEAS - MV E MAX VEL: 95.7 CM/SEC
BH CV ECHO MEAS - MV E/A: 2.9
BH CV ECHO MEAS - MV MAX PG: 5.2 MMHG
BH CV ECHO MEAS - MV MEAN PG: 1.55 MMHG
BH CV ECHO MEAS - MV P1/2T: 97.5 MSEC
BH CV ECHO MEAS - MV V2 VTI: 39.2 CM
BH CV ECHO MEAS - MVA(P1/2T): 2.26 CM2
BH CV ECHO MEAS - MVA(VTI): 1.74 CM2
BH CV ECHO MEAS - PA V2 MAX: 86.9 CM/SEC
BH CV ECHO MEAS - PULM A REVS DUR: 0.15 SEC
BH CV ECHO MEAS - PULM A REVS VEL: 23 CM/SEC
BH CV ECHO MEAS - PULM DIAS VEL: 46.5 CM/SEC
BH CV ECHO MEAS - PULM S/D: 1.15
BH CV ECHO MEAS - PULM SYS VEL: 53.6 CM/SEC
BH CV ECHO MEAS - RAP SYSTOLE: 3 MMHG
BH CV ECHO MEAS - RV MAX PG: 2.38 MMHG
BH CV ECHO MEAS - RV V1 MAX: 77.1 CM/SEC
BH CV ECHO MEAS - RV V1 VTI: 21 CM
BH CV ECHO MEAS - RVSP: 14.4 MMHG
BH CV ECHO MEAS - SI(MOD-SP2): 28.1 ML/M2
BH CV ECHO MEAS - SI(MOD-SP4): 29.3 ML/M2
BH CV ECHO MEAS - SUP REN AO DIAM: 1.3 CM
BH CV ECHO MEAS - SV(LVOT): 68.2 ML
BH CV ECHO MEAS - SV(MOD-SP2): 46 ML
BH CV ECHO MEAS - SV(MOD-SP4): 48 ML
BH CV ECHO MEAS - TAPSE (>1.6): 1.81 CM
BH CV ECHO MEAS - TR MAX PG: 11.4 MMHG
BH CV ECHO MEAS - TR MAX VEL: 168.5 CM/SEC
BH CV ECHO MEASUREMENTS AVERAGE E/E' RATIO: 6.62
BH CV XLRA - RV BASE: 3.4 CM
BH CV XLRA - RV LENGTH: 6.3 CM
BH CV XLRA - RV MID: 2.37 CM
BH CV XLRA - TDI S': 11.7 CM/SEC
LEFT ATRIUM VOLUME INDEX: 23.9 ML/M2
SINUS: 2.34 CM
STJ: 2.23 CM

## 2023-10-16 PROCEDURE — 93306 TTE W/DOPPLER COMPLETE: CPT | Performed by: INTERNAL MEDICINE

## 2023-10-16 PROCEDURE — 93306 TTE W/DOPPLER COMPLETE: CPT

## 2023-10-16 NOTE — TELEPHONE ENCOUNTER
----- Message from Jn Regan III, MD sent at 10/16/2023 12:51 PM EDT -----  Please call- normal results

## 2023-10-16 NOTE — TELEPHONE ENCOUNTER
Left voicemail for Connie Mike requesting callback.    Thank you,  Jen YEPEZ RN  Triage Nurse [facility]   12:59 EDT

## 2023-10-16 NOTE — TELEPHONE ENCOUNTER
Pt called back. Went over results. She verbalized understanding.    Thank you,    Mirella Hahn, RN  Triage [facility]  10/16/23 16:01 EDT

## 2024-01-11 RX ORDER — PANTOPRAZOLE SODIUM 40 MG/1
40 TABLET, DELAYED RELEASE ORAL DAILY
Qty: 90 TABLET | Refills: 3 | Status: SHIPPED | OUTPATIENT
Start: 2024-01-11

## 2024-01-11 NOTE — TELEPHONE ENCOUNTER
Caller: Connie Mike    Relationship: Self      Requested Prescriptions:   Requested Prescriptions     Pending Prescriptions Disp Refills    pantoprazole (PROTONIX) 40 MG EC tablet 90 tablet 3     Sig: Take 1 tablet by mouth Daily.        Pharmacy where request should be sent: [facility] [address] [phone] [facility] [phone]      Last office visit with prescribing clinician: 9/19/2023   Last telemedicine visit with prescribing clinician: Visit date not found   Next office visit with prescribing clinician: 1/30/2024     Additional details provided by patient: PATIENT STATES [facility] HAS BEEN TRYING TO CONTACT THE OFFICE TO REFILL THIS MEDICATION AND NOT GOTTEN A RESPONSE.  PATIENT WANTING A 90 DAY SUPPLY.     Does the patient have less than a 3 day supply:  [] Yes  [x] No    Would you like a call back once the refill request has been completed: [] Yes [x] No    If the office needs to give you a call back, can they leave a voicemail: [] Yes [x] No    Arcenio Pena Rep   01/11/24 08:55 EST

## 2024-01-30 ENCOUNTER — OFFICE VISIT (OUTPATIENT)
Dept: FAMILY MEDICINE CLINIC | Facility: CLINIC | Age: 39
End: 2024-01-30
Payer: COMMERCIAL

## 2024-01-30 VITALS
WEIGHT: 128 LBS | HEART RATE: 56 BPM | SYSTOLIC BLOOD PRESSURE: 108 MMHG | RESPIRATION RATE: 18 BRPM | BODY MASS INDEX: 21.85 KG/M2 | DIASTOLIC BLOOD PRESSURE: 62 MMHG | HEIGHT: 64 IN | OXYGEN SATURATION: 99 %

## 2024-01-30 DIAGNOSIS — K21.9 GERD WITHOUT ESOPHAGITIS: Primary | ICD-10-CM

## 2024-01-30 PROCEDURE — 99214 OFFICE O/P EST MOD 30 MIN: CPT | Performed by: FAMILY MEDICINE

## 2024-01-30 RX ORDER — OMEPRAZOLE 40 MG/1
40 CAPSULE, DELAYED RELEASE ORAL DAILY
Qty: 90 CAPSULE | Refills: 2 | Status: SHIPPED | OUTPATIENT
Start: 2024-01-30

## 2024-01-30 NOTE — PROGRESS NOTES
Chief Complaint   Patient presents with    Follow-up     GERD         Subjective   Connie Mike is a 38 y.o. female.     History of Present Illness   F/U DIANN.  Still with heartburn.  I needs something different.  I have heartburn every other day still.      The following portions of the patient's history were reviewed and updated as appropriate: allergies, current medications, past family history, past medical history, past social history, past surgical history and problem list.    Review of Systems   Constitutional:  Negative for appetite change and fatigue.   HENT:  Negative for nosebleeds and sore throat.    Eyes:  Negative for blurred vision and visual disturbance.   Respiratory:  Negative for shortness of breath and wheezing.    Cardiovascular:  Negative for chest pain and leg swelling.   Gastrointestinal:  Positive for GERD. Negative for abdominal distention and abdominal pain.   Endocrine: Negative for cold intolerance and polyuria.   Genitourinary:  Negative for dysuria and hematuria.   Musculoskeletal:  Negative for arthralgias and myalgias.   Skin:  Negative for color change and rash.   Neurological:  Negative for weakness and confusion.   Psychiatric/Behavioral:  Negative for agitation and depressed mood.        Patient Active Problem List   Diagnosis    UTI symptoms    Tingling    Temporal pain    Non-smoker    Cystitis, chronic    Cystitis, acute    Arthralgia of temporomandibular joint    Anxiety    Amenorrhea    Acute pharyngitis    Abdominal pain, acute, left lower quadrant    Encounter for annual health examination    Seasonal allergic rhinitis due to pollen    GERD without esophagitis    Encounter for screening for cardiovascular disorders    Immunization deficiency    Chronic idiopathic constipation    Bloating    Abnormal sense of taste    Allergic reaction    Other specified anemias    Polyarthralgia    Lateral epicondylitis of both elbows    Shortness of breath    Bradycardia       No Known  Allergies      Current Outpatient Medications:     LESA 3-0.02 MG per tablet, , Disp: , Rfl:     omeprazole (priLOSEC) 40 MG capsule, Take 1 capsule by mouth Daily., Disp: 90 capsule, Rfl: 2    Past Medical History:   Diagnosis Date    Abdominal pain, acute, left lower quadrant     Acute pharyngitis     Amenorrhea     Anxiety     Arthralgia of temporomandibular joint     Cystitis, acute     Cystitis, chronic     Encounter for preconception consultation     Encounter for preventive health examination     Non-smoker     Never a smoker    Temporal pain     Tennis elbow     Tingling     UTI symptoms     Visit for gynecologic examination        Past Surgical History:   Procedure Laterality Date    ADENOIDECTOMY      EYE SURGERY      TONSILLECTOMY      TONSILLECTOMY         Family History   Problem Relation Age of Onset    Alcohol abuse Father     Hypertension Father     Diabetes type II Father     Heart disease Father     Breast cancer Paternal Grandmother     No Known Problems Other        Social History     Tobacco Use    Smoking status: Former     Packs/day: 0.25     Years: 5.00     Additional pack years: 0.00     Total pack years: 1.25     Types: Cigarettes     Quit date: 2017     Years since quittin.1    Smokeless tobacco: Never    Tobacco comments:     quit in 2017   Substance Use Topics    Alcohol use: Yes     Alcohol/week: 5.0 standard drinks of alcohol     Types: 5 Cans of beer per week     Comment: Minimal consumption            Objective     Vitals:    24 1351   BP: 108/62   Pulse: 56   Resp: 18   SpO2: 99%     Body mass index is 21.85 kg/m².    Physical Exam  Vitals reviewed.   Constitutional:       Appearance: She is well-developed. She is not diaphoretic.   HENT:      Head: Normocephalic and atraumatic.   Eyes:      General: No scleral icterus.     Pupils: Pupils are equal, round, and reactive to light.   Neck:      Thyroid: No thyromegaly.   Cardiovascular:      Rate and Rhythm: Normal rate  and regular rhythm.      Heart sounds: No murmur heard.     No friction rub. No gallop.   Pulmonary:      Effort: Pulmonary effort is normal. No respiratory distress.      Breath sounds: No wheezing or rales.   Chest:      Chest wall: No tenderness.   Abdominal:      General: Bowel sounds are normal. There is no distension.      Palpations: Abdomen is soft.      Tenderness: There is no abdominal tenderness.   Musculoskeletal:         General: No deformity. Normal range of motion.   Lymphadenopathy:      Cervical: No cervical adenopathy.   Skin:     General: Skin is warm and dry.      Findings: No rash.   Neurological:      Cranial Nerves: No cranial nerve deficit.      Motor: No abnormal muscle tone.         Lab Results   Component Value Date    GLUCOSE 89 09/19/2023    BUN 18 09/19/2023    CREATININE 0.99 09/19/2023    EGFRIFNONA 78 01/30/2020    EGFRIFAFRI 94 01/30/2020    BCR 18.2 09/19/2023    K 5.0 09/19/2023    CO2 26.2 09/19/2023    CALCIUM 9.6 09/19/2023    PROTENTOTREF 7.1 09/19/2023    ALBUMIN 4.8 09/19/2023    LABIL2 2.1 09/19/2023    AST 26 09/19/2023    ALT 13 09/19/2023       WBC   Date Value Ref Range Status   09/19/2023 4.53 3.40 - 10.80 10*3/mm3 Final   09/05/2019 7.01 4.5 - 11.0 10*3/uL Final     RBC   Date Value Ref Range Status   09/19/2023 4.10 3.77 - 5.28 10*6/mm3 Final   09/05/2019 3.88 (L) 4.0 - 5.2 10*6/uL Final     Hemoglobin   Date Value Ref Range Status   09/19/2023 13.6 12.0 - 15.9 g/dL Final   09/06/2019 11.2 (L) 12.0 - 16.0 g/dL Final     Hematocrit   Date Value Ref Range Status   09/19/2023 39.3 34.0 - 46.6 % Final   09/06/2019 34.3 (L) 36.0 - 46.0 % Final     MCV   Date Value Ref Range Status   09/19/2023 95.9 79.0 - 97.0 fL Final   09/05/2019 94.3 80.0 - 100.0 fL Final     MCH   Date Value Ref Range Status   09/19/2023 33.2 (H) 26.6 - 33.0 pg Final   09/05/2019 30.9 26.0 - 34.0 pg Final     MCHC   Date Value Ref Range Status   09/19/2023 34.6 31.5 - 35.7 g/dL Final   09/05/2019 32.8  31.0 - 37.0 g/dL Final     RDW   Date Value Ref Range Status   09/19/2023 11.4 (L) 12.3 - 15.4 % Final   09/05/2019 13.9 12.0 - 16.8 % Final     MPV   Date Value Ref Range Status   09/05/2019 9.9 6.7 - 10.8 fL Final     Platelets   Date Value Ref Range Status   09/19/2023 214 140 - 450 10*3/mm3 Final   09/05/2019 168 140 - 440 10*3/uL Final     Neutrophil Rel %   Date Value Ref Range Status   09/19/2023 47.7 42.7 - 76.0 % Final   09/05/2019 70.6 45 - 80 % Final     Lymphocyte Rel %   Date Value Ref Range Status   09/19/2023 40.8 19.6 - 45.3 % Final   09/05/2019 19.8 15 - 50 % Final     Monocyte Rel %   Date Value Ref Range Status   09/19/2023 7.1 5.0 - 12.0 % Final   09/05/2019 8.0 0 - 15 % Final     Eosinophil Rel %   Date Value Ref Range Status   09/19/2023 3.3 0.3 - 6.2 % Final     Eosinophil %   Date Value Ref Range Status   09/05/2019 1.1 0 - 7 % Final     Basophil Rel %   Date Value Ref Range Status   09/19/2023 0.9 0.0 - 1.5 % Final   09/05/2019 0.1 0 - 2 % Final     Immature Grans %   Date Value Ref Range Status   09/05/2019 0.4 (H) 0 % Final     Neutrophils Absolute   Date Value Ref Range Status   09/19/2023 2.16 1.70 - 7.00 10*3/mm3 Final   09/05/2019 4.94 2.0 - 8.8 10*3/uL Final     Lymphocytes Absolute   Date Value Ref Range Status   09/19/2023 1.85 0.70 - 3.10 10*3/mm3 Final   09/05/2019 1.39 0.7 - 5.5 10*3/uL Final     Monocytes Absolute   Date Value Ref Range Status   09/19/2023 0.32 0.10 - 0.90 10*3/mm3 Final   09/05/2019 0.56 0.0 - 1.7 10*3/uL Final     Eosinophils Absolute   Date Value Ref Range Status   09/19/2023 0.15 0.00 - 0.40 10*3/mm3 Final   09/05/2019 0.08 0.0 - 0.8 10*3/uL Final     Basophils Absolute   Date Value Ref Range Status   09/19/2023 0.04 0.00 - 0.20 10*3/mm3 Final   09/05/2019 0.01 0.0 - 0.2 10*3/uL Final     Immature Grans, Absolute   Date Value Ref Range Status   09/05/2019 0.03 <1 10*3/uL Final     nRBC   Date Value Ref Range Status   09/19/2023 0.0 0.0 - 0.2 /100 WBC Final  "  09/05/2019 0 0 /100(WBC) Final       No results found for: \"HGBA1C\"    Lab Results   Component Value Date    OVZSQYTF84 383 03/24/2022       TSH   Date Value Ref Range Status   12/08/2022 2.580 0.270 - 4.200 uIU/mL Final       No results found for: \"CHOL\"  Lab Results   Component Value Date    TRIG 71 09/19/2023     Lab Results   Component Value Date    HDL 89 (H) 09/19/2023     Lab Results   Component Value Date    LDL 93 09/19/2023     Lab Results   Component Value Date    VLDL 13 09/19/2023     No results found for: \"LDLHDL\"      Procedures    Assessment & Plan   Problems Addressed this Visit       GERD without esophagitis - Primary    Relevant Medications    omeprazole (priLOSEC) 40 MG capsule    Other Relevant Orders    Ambulatory Referral to Gastroenterology     Diagnoses         Codes Comments    GERD without esophagitis    -  Primary ICD-10-CM: K21.9  ICD-9-CM: 530.81         DIANN.  Uncontrolled. Chronic.   Stop pantoprazole.  Start omeprazole 40 in am and prilosec otc 2 at night.    To GI .      Orders Placed This Encounter   Procedures    Ambulatory Referral to Gastroenterology     Referral Priority:   Routine     Referral Type:   Consultation     Referral Reason:   Specialty Services Required     Referred to Provider:   Carey Jett MD     Requested Specialty:   Gastroenterology     Number of Visits Requested:   1       Current Outpatient Medications   Medication Sig Dispense Refill    LESA 3-0.02 MG per tablet       omeprazole (priLOSEC) 40 MG capsule Take 1 capsule by mouth Daily. 90 capsule 2     No current facility-administered medications for this visit.       Connie Mike had no medications administered during this visit.    Return in about 3 months (around 4/30/2024).    There are no Patient Instructions on file for this visit.       "

## 2024-09-21 DIAGNOSIS — K21.9 GERD WITHOUT ESOPHAGITIS: ICD-10-CM

## 2024-09-22 RX ORDER — OMEPRAZOLE 40 MG/1
40 CAPSULE, DELAYED RELEASE ORAL DAILY
Qty: 90 CAPSULE | Refills: 3 | Status: SHIPPED | OUTPATIENT
Start: 2024-09-22

## 2024-11-04 ENCOUNTER — OFFICE VISIT (OUTPATIENT)
Dept: FAMILY MEDICINE CLINIC | Facility: CLINIC | Age: 39
End: 2024-11-04
Payer: COMMERCIAL

## 2024-11-04 VITALS
RESPIRATION RATE: 18 BRPM | WEIGHT: 125.6 LBS | DIASTOLIC BLOOD PRESSURE: 70 MMHG | SYSTOLIC BLOOD PRESSURE: 112 MMHG | TEMPERATURE: 98.6 F | BODY MASS INDEX: 21.44 KG/M2 | HEART RATE: 61 BPM | OXYGEN SATURATION: 98 % | HEIGHT: 64 IN

## 2024-11-04 DIAGNOSIS — Z00.00 ENCOUNTER FOR ANNUAL HEALTH EXAMINATION: Primary | ICD-10-CM

## 2024-11-04 DIAGNOSIS — K21.9 GERD WITHOUT ESOPHAGITIS: ICD-10-CM

## 2024-11-04 DIAGNOSIS — L72.11 PILAR CYST: ICD-10-CM

## 2024-11-04 DIAGNOSIS — Z13.6 ENCOUNTER FOR SCREENING FOR CARDIOVASCULAR DISORDERS: ICD-10-CM

## 2024-11-04 PROCEDURE — 99213 OFFICE O/P EST LOW 20 MIN: CPT | Performed by: FAMILY MEDICINE

## 2024-11-04 PROCEDURE — 99395 PREV VISIT EST AGE 18-39: CPT | Performed by: FAMILY MEDICINE

## 2024-11-04 RX ORDER — NORGESTIMATE AND ETHINYL ESTRADIOL 7DAYSX3 LO
1 KIT ORAL DAILY
COMMUNITY

## 2024-11-04 NOTE — PROGRESS NOTES
Patient here for annual physical exam    Subjective   Connie Mike is a 39 y.o. female.     History of Present Illness   40 yo WF here fro annual.   The following portions of the patient's history were reviewed and updated as appropriate: allergies, current medications, past family history, past medical history, past social history, past surgical history and problem list    Last colonoscopy:na  Optometry:UTD  Dentist: UTD.    Last PSA(if applicable):na  Last mammo(if applicable):NA.    C/o spot on back of scalp for years.  Increasing size.    F/U DIANN.  At times with certain foods, I get heartburn.  ON occasion 1-2 times a week.      Immunization History   Administered Date(s) Administered    COVID-19 (PFIZER) Purple Cap Monovalent 03/26/2021, 04/15/2021, 11/16/2021    Flu Vaccine Intradermal Quad 18-64YR 12/05/2014    Flu Vaccine Split Quad 10/22/2021    Flublok 18+yrs 09/29/2020    Fluzone (or Fluarix & Flulaval for VFC) >6mos 09/05/2017, 10/03/2019, 10/22/2021    Hepatitis A 10/03/2019, 07/15/2020    Influenza Inj MDCK Preserative Free 09/20/2024    Influenza Injectable Mdck Pf Quad 09/27/2022, 11/22/2023    Influenza, Unspecified 09/27/2022    Tdap 12/05/2014, 06/13/2019       Review of Systems   Constitutional:  Negative for appetite change and fatigue.   HENT:  Negative for nosebleeds and sore throat.    Eyes:  Negative for blurred vision and visual disturbance.   Respiratory:  Negative for shortness of breath and wheezing.    Cardiovascular:  Negative for chest pain and leg swelling.   Gastrointestinal:  Positive for GERD. Negative for abdominal distention and abdominal pain.   Endocrine: Negative for cold intolerance and polyuria.   Genitourinary:  Negative for dysuria and hematuria.   Musculoskeletal:  Negative for arthralgias and myalgias.   Skin:  Negative for color change and rash.   Neurological:  Negative for weakness and confusion.   Psychiatric/Behavioral:  Negative for agitation and depressed  mood.        Patient Active Problem List   Diagnosis    UTI symptoms    Tingling    Temporal pain    Non-smoker    Cystitis, chronic    Cystitis, acute    Arthralgia of temporomandibular joint    Anxiety    Amenorrhea    Acute pharyngitis    Abdominal pain, acute, left lower quadrant    Encounter for annual health examination    Seasonal allergic rhinitis due to pollen    GERD without esophagitis    Encounter for screening for cardiovascular disorders    Immunization deficiency    Chronic idiopathic constipation    Bloating    Abnormal sense of taste    Allergic reaction    Other specified anemias    Polyarthralgia    Lateral epicondylitis of both elbows    Shortness of breath    Bradycardia    Pilar cyst       No Known Allergies      Current Outpatient Medications:     norgestimate-ethinyl estradiol (Ortho Tri-Cyclen Lo) 0.18/0.215/0.25 MG-25 MCG per tablet, Take 1 tablet by mouth Daily., Disp: , Rfl:     omeprazole (priLOSEC) 40 MG capsule, TAKE 1 CAPSULE BY MOUTH DAILY, Disp: 90 capsule, Rfl: 3    Past Medical History:   Diagnosis Date    Abdominal pain, acute, left lower quadrant     Acute pharyngitis     Amenorrhea     Anxiety     Arthralgia of temporomandibular joint     Cystitis, acute     Cystitis, chronic     Encounter for preconception consultation     Encounter for preventive health examination     Non-smoker     Never a smoker    Temporal pain     Tennis elbow     Tingling     UTI symptoms     Visit for gynecologic examination        Past Surgical History:   Procedure Laterality Date    ADENOIDECTOMY      EYE SURGERY      TONSILLECTOMY      TONSILLECTOMY         Family History   Problem Relation Age of Onset    Alcohol abuse Father     Hypertension Father     Diabetes type II Father     Heart disease Father     Breast cancer Paternal Grandmother     No Known Problems Other        Social History     Tobacco Use    Smoking status: Former     Current packs/day: 0.00     Average packs/day: 0.3 packs/day for  5.0 years (1.3 ttl pk-yrs)     Types: Cigarettes     Start date: 2012     Quit date: 2017     Years since quittin.9    Smokeless tobacco: Never    Tobacco comments:     quit in 2017   Substance Use Topics    Alcohol use: Yes     Alcohol/week: 5.0 standard drinks of alcohol     Types: 5 Cans of beer per week     Comment: Minimal consumption            Objective     Vitals:    24 1005   BP: 112/70   Pulse: 61   Resp: 18   Temp: 98.6 °F (37 °C)   SpO2: 98%     Body mass index is 21.44 kg/m².    Physical Exam  Vitals reviewed.   Constitutional:       Appearance: She is well-developed. She is not diaphoretic.   HENT:      Head: Normocephalic and atraumatic.   Eyes:      General: No scleral icterus.     Pupils: Pupils are equal, round, and reactive to light.   Neck:      Thyroid: No thyromegaly.   Cardiovascular:      Rate and Rhythm: Normal rate and regular rhythm.      Heart sounds: No murmur heard.     No friction rub. No gallop.   Pulmonary:      Effort: Pulmonary effort is normal. No respiratory distress.      Breath sounds: No wheezing or rales.   Chest:      Chest wall: No tenderness.   Abdominal:      General: Bowel sounds are normal. There is no distension.      Palpations: Abdomen is soft.      Tenderness: There is no abdominal tenderness.   Musculoskeletal:         General: No deformity. Normal range of motion.   Lymphadenopathy:      Cervical: No cervical adenopathy.   Skin:     General: Skin is warm and dry.      Findings: No rash.      Comments: 1.5 cm moveable cyst R posterior scalp.     Neurological:      Cranial Nerves: No cranial nerve deficit.      Motor: No abnormal muscle tone.         Lab Results   Component Value Date    GLUCOSE 89 2023    BUN 18 2023    CREATININE 0.99 2023    EGFRIFNONA 78 2020    EGFRIFAFRI 94 2020    BCR 18.2 2023    K 5.0 2023    CO2 26.2 2023    CALCIUM 9.6 2023    PROTENTOTREF 7.1 2023    ALBUMIN  4.8 09/19/2023    LABIL2 2.1 09/19/2023    AST 26 09/19/2023    ALT 13 09/19/2023       WBC   Date Value Ref Range Status   09/19/2023 4.53 3.40 - 10.80 10*3/mm3 Final   09/05/2019 7.01 4.5 - 11.0 10*3/uL Final     RBC   Date Value Ref Range Status   09/19/2023 4.10 3.77 - 5.28 10*6/mm3 Final   09/05/2019 3.88 (L) 4.0 - 5.2 10*6/uL Final     Hemoglobin   Date Value Ref Range Status   09/19/2023 13.6 12.0 - 15.9 g/dL Final   09/06/2019 11.2 (L) 12.0 - 16.0 g/dL Final     Hematocrit   Date Value Ref Range Status   09/19/2023 39.3 34.0 - 46.6 % Final   09/06/2019 34.3 (L) 36.0 - 46.0 % Final     MCV   Date Value Ref Range Status   09/19/2023 95.9 79.0 - 97.0 fL Final   09/05/2019 94.3 80.0 - 100.0 fL Final     MCH   Date Value Ref Range Status   09/19/2023 33.2 (H) 26.6 - 33.0 pg Final   09/05/2019 30.9 26.0 - 34.0 pg Final     MCHC   Date Value Ref Range Status   09/19/2023 34.6 31.5 - 35.7 g/dL Final   09/05/2019 32.8 31.0 - 37.0 g/dL Final     RDW   Date Value Ref Range Status   09/19/2023 11.4 (L) 12.3 - 15.4 % Final   09/05/2019 13.9 12.0 - 16.8 % Final     MPV   Date Value Ref Range Status   09/05/2019 9.9 6.7 - 10.8 fL Final     Platelets   Date Value Ref Range Status   09/19/2023 214 140 - 450 10*3/mm3 Final   09/05/2019 168 140 - 440 10*3/uL Final     Neutrophil Rel %   Date Value Ref Range Status   09/19/2023 47.7 42.7 - 76.0 % Final   09/05/2019 70.6 45 - 80 % Final     Lymphocyte Rel %   Date Value Ref Range Status   09/19/2023 40.8 19.6 - 45.3 % Final   09/05/2019 19.8 15 - 50 % Final     Monocyte Rel %   Date Value Ref Range Status   09/19/2023 7.1 5.0 - 12.0 % Final   09/05/2019 8.0 0 - 15 % Final     Eosinophil Rel %   Date Value Ref Range Status   09/19/2023 3.3 0.3 - 6.2 % Final     Eosinophil %   Date Value Ref Range Status   09/05/2019 1.1 0 - 7 % Final     Basophil Rel %   Date Value Ref Range Status   09/19/2023 0.9 0.0 - 1.5 % Final   09/05/2019 0.1 0 - 2 % Final     Immature Grans %   Date Value  "Ref Range Status   09/05/2019 0.4 (H) 0 % Final     Neutrophils Absolute   Date Value Ref Range Status   09/19/2023 2.16 1.70 - 7.00 10*3/mm3 Final   09/05/2019 4.94 2.0 - 8.8 10*3/uL Final     Lymphocytes Absolute   Date Value Ref Range Status   09/19/2023 1.85 0.70 - 3.10 10*3/mm3 Final   09/05/2019 1.39 0.7 - 5.5 10*3/uL Final     Monocytes Absolute   Date Value Ref Range Status   09/19/2023 0.32 0.10 - 0.90 10*3/mm3 Final   09/05/2019 0.56 0.0 - 1.7 10*3/uL Final     Eosinophils Absolute   Date Value Ref Range Status   09/19/2023 0.15 0.00 - 0.40 10*3/mm3 Final   09/05/2019 0.08 0.0 - 0.8 10*3/uL Final     Basophils Absolute   Date Value Ref Range Status   09/19/2023 0.04 0.00 - 0.20 10*3/mm3 Final   09/05/2019 0.01 0.0 - 0.2 10*3/uL Final     Immature Grans, Absolute   Date Value Ref Range Status   09/05/2019 0.03 <1 10*3/uL Final     nRBC   Date Value Ref Range Status   09/19/2023 0.0 0.0 - 0.2 /100 WBC Final   09/05/2019 0 0 /100(WBC) Final       No results found for: \"HGBA1C\"    Lab Results   Component Value Date    CGLGMJTS63 383 03/24/2022       TSH   Date Value Ref Range Status   12/08/2022 2.580 0.270 - 4.200 uIU/mL Final       No results found for: \"CHOL\"  Lab Results   Component Value Date    TRIG 71 09/19/2023     Lab Results   Component Value Date    HDL 89 (H) 09/19/2023     Lab Results   Component Value Date    LDL 93 09/19/2023     Lab Results   Component Value Date    VLDL 13 09/19/2023     No results found for: \"LDLHDL\"      Procedures    Assessment & Plan   Problems Addressed this Visit       Encounter for annual health examination - Primary    Relevant Orders    CBC & Differential    Comprehensive Metabolic Panel    GERD without esophagitis    Encounter for screening for cardiovascular disorders    Relevant Orders    Comprehensive Metabolic Panel    Lipid Panel With / Chol / HDL Ratio    Pilar cyst     Diagnoses         Codes Comments    Encounter for annual health examination    -  Primary " ICD-10-CM: Z00.00  ICD-9-CM: V70.0     Pilar cyst     ICD-10-CM: L72.11  ICD-9-CM: 704.41     Encounter for screening for cardiovascular disorders     ICD-10-CM: Z13.6  ICD-9-CM: V81.2     GERD without esophagitis     ICD-10-CM: K21.9  ICD-9-CM: 530.81             Pilar cyst.  Informed pt it is cosmetic.  If desires, will refer to general surgery.     Gerd.  Uncontrolled at times.  Use pepcid Ac in addition to omeprazole for triggers.    Preventive Counseling:  Encouraged to stay active.  Covid vaccine declined.  Flu utd.  HEP A UTD.   Dentist UTD.  Optho UTD.        Orders Placed This Encounter   Procedures    Comprehensive Metabolic Panel     Order Specific Question:   Release to patient     Answer:   Routine Release [[id number]]    Lipid Panel With / Chol / HDL Ratio     Order Specific Question:   Release to patient     Answer:   Routine Release [[id number]]    CBC & Differential     Order Specific Question:   Manual Differential     Answer:   No     Order Specific Question:   Release to patient     Answer:   Routine Release [[id number]]       Current Outpatient Medications   Medication Sig Dispense Refill    norgestimate-ethinyl estradiol (Ortho Tri-Cyclen Lo) 0.18/0.215/0.25 MG-25 MCG per tablet Take 1 tablet by mouth Daily.      omeprazole (priLOSEC) 40 MG capsule TAKE 1 CAPSULE BY MOUTH DAILY 90 capsule 3     No current facility-administered medications for this visit.       Return in about 1 year (around 11/4/2025) for Annual physical.    There are no Patient Instructions on file for this visit.

## 2024-11-05 LAB
ALBUMIN SERPL-MCNC: 4.2 G/DL (ref 3.5–5.2)
ALBUMIN/GLOB SERPL: 1.8 G/DL
ALP SERPL-CCNC: 46 U/L (ref 39–117)
ALT SERPL-CCNC: 13 U/L (ref 1–33)
AST SERPL-CCNC: 28 U/L (ref 1–32)
BASOPHILS # BLD AUTO: 0.03 10*3/MM3 (ref 0–0.2)
BASOPHILS NFR BLD AUTO: 0.4 % (ref 0–1.5)
BILIRUB SERPL-MCNC: 0.3 MG/DL (ref 0–1.2)
BUN SERPL-MCNC: 15 MG/DL (ref 6–20)
BUN/CREAT SERPL: 15.5 (ref 7–25)
CALCIUM SERPL-MCNC: 9.3 MG/DL (ref 8.6–10.5)
CHLORIDE SERPL-SCNC: 103 MMOL/L (ref 98–107)
CHOLEST SERPL-MCNC: 186 MG/DL (ref 0–200)
CHOLEST/HDLC SERPL: 2.35 {RATIO}
CO2 SERPL-SCNC: 26.3 MMOL/L (ref 22–29)
CREAT SERPL-MCNC: 0.97 MG/DL (ref 0.57–1)
EGFRCR SERPLBLD CKD-EPI 2021: 76.4 ML/MIN/1.73
EOSINOPHIL # BLD AUTO: 0.14 10*3/MM3 (ref 0–0.4)
EOSINOPHIL NFR BLD AUTO: 2.1 % (ref 0.3–6.2)
ERYTHROCYTE [DISTWIDTH] IN BLOOD BY AUTOMATED COUNT: 11.1 % (ref 12.3–15.4)
GLOBULIN SER CALC-MCNC: 2.4 GM/DL
GLUCOSE SERPL-MCNC: 85 MG/DL (ref 65–99)
HCT VFR BLD AUTO: 38.7 % (ref 34–46.6)
HDLC SERPL-MCNC: 79 MG/DL (ref 40–60)
HGB BLD-MCNC: 13 G/DL (ref 12–15.9)
IMM GRANULOCYTES # BLD AUTO: 0.02 10*3/MM3 (ref 0–0.05)
IMM GRANULOCYTES NFR BLD AUTO: 0.3 % (ref 0–0.5)
LDLC SERPL CALC-MCNC: 94 MG/DL (ref 0–100)
LYMPHOCYTES # BLD AUTO: 1.62 10*3/MM3 (ref 0.7–3.1)
LYMPHOCYTES NFR BLD AUTO: 24 % (ref 19.6–45.3)
MCH RBC QN AUTO: 32.5 PG (ref 26.6–33)
MCHC RBC AUTO-ENTMCNC: 33.6 G/DL (ref 31.5–35.7)
MCV RBC AUTO: 96.8 FL (ref 79–97)
MONOCYTES # BLD AUTO: 0.49 10*3/MM3 (ref 0.1–0.9)
MONOCYTES NFR BLD AUTO: 7.2 % (ref 5–12)
NEUTROPHILS # BLD AUTO: 4.46 10*3/MM3 (ref 1.7–7)
NEUTROPHILS NFR BLD AUTO: 66 % (ref 42.7–76)
NRBC BLD AUTO-RTO: 0 /100 WBC (ref 0–0.2)
PLATELET # BLD AUTO: 245 10*3/MM3 (ref 140–450)
POTASSIUM SERPL-SCNC: 4.4 MMOL/L (ref 3.5–5.2)
PROT SERPL-MCNC: 6.6 G/DL (ref 6–8.5)
RBC # BLD AUTO: 4 10*6/MM3 (ref 3.77–5.28)
SODIUM SERPL-SCNC: 140 MMOL/L (ref 136–145)
TRIGL SERPL-MCNC: 72 MG/DL (ref 0–150)
VLDLC SERPL CALC-MCNC: 13 MG/DL (ref 5–40)
WBC # BLD AUTO: 6.76 10*3/MM3 (ref 3.4–10.8)